# Patient Record
Sex: FEMALE | Race: BLACK OR AFRICAN AMERICAN | NOT HISPANIC OR LATINO | Employment: FULL TIME | URBAN - METROPOLITAN AREA
[De-identification: names, ages, dates, MRNs, and addresses within clinical notes are randomized per-mention and may not be internally consistent; named-entity substitution may affect disease eponyms.]

---

## 2018-04-26 ENCOUNTER — TREATMENT (OUTPATIENT)
Dept: PHYSICAL THERAPY | Facility: CLINIC | Age: 33
End: 2018-04-26

## 2018-04-26 DIAGNOSIS — S39.012D LUMBAR SPINE STRAIN, SUBSEQUENT ENCOUNTER: ICD-10-CM

## 2018-04-26 DIAGNOSIS — M54.50 ACUTE RIGHT-SIDED LOW BACK PAIN WITHOUT SCIATICA: ICD-10-CM

## 2018-04-26 DIAGNOSIS — M25.521 RIGHT ELBOW PAIN: Primary | ICD-10-CM

## 2018-04-26 DIAGNOSIS — S50.01XD CONTUSION OF RIGHT ELBOW, SUBSEQUENT ENCOUNTER: ICD-10-CM

## 2018-04-26 PROCEDURE — 97112 NEUROMUSCULAR REEDUCATION: CPT | Performed by: PHYSICAL THERAPIST

## 2018-04-26 PROCEDURE — 97001 PR PHYS THERAPY EVALUATION: CPT | Performed by: PHYSICAL THERAPIST

## 2018-04-26 PROCEDURE — 97110 THERAPEUTIC EXERCISES: CPT | Performed by: PHYSICAL THERAPIST

## 2018-04-26 PROCEDURE — 97014 ELECTRIC STIMULATION THERAPY: CPT | Performed by: PHYSICAL THERAPIST

## 2018-04-26 NOTE — PROGRESS NOTES
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  The pt was able to rise from the fall, but she had instant burning pain in her R shoulder and back.  This burning pain continued for a few days, but eventually subsided.      The pt has constant pain and is unable to sleep at night.    The pt is currently taking pain meds and performing gentle stretches which are helping.    At work she is required to push/pull a WC with a client in it, transfer pt's in bed, the pt needs to clean clients' rooms: floor to OH with sweeping, mopping, vaccuming, and must be able to perform prolong standing, sitting, walking, walking with packages, and meal preparation for the clients.  Currently these tasks increase her pains.    Additionally, the pt reports she is limited with ADLs at home.              Patient Occupation: Life Skill Trainer   Precautions and Work Restrictions: light dutes: no push/pill, lifting >20#Pain  Current pain ratin  At best pain ratin  At worst pain ratin  Quality: dull ache and burning  Relieving factors: change in position and medications  Aggravating factors: overhead activity, ambulation, squatting, lifting, standing, prolonged positioning, repetitive movement and outstretched reach  Progression: improved    Social Support  Lives in: apartment  Lives with: spouse and young children    Hand dominance: right    Diagnostic Tests  X-ray: normal    Patient Goals  Patient goals for therapy: decreased pain, increased strength, return to work and independence with ADLs/IADLs             Objective       Active Range of Motion     Additional Active Range of Motion Details  Pt amb with a slow and guarded pace and kept her R UE tucked close to her body with minimal arm sway.  The pt  required hand assist to rise and to sit from surfaces.    L UE WFL for all movements  All cervical ROM are WFL and without pain, except cerivcal rot R increase low back pain    R Shoulder flex: 90 deg/ PROM: 180deg  R shoulder abd: 90 deg/ PROM: 160 deg, pain with returning the arm into adduction  IR: S1  ER: Suprapsinatus muscle belly    Elbow ROM:   Flex: full, reports of pain  Ext: full, reports of pain   Sup: full, reports of pain  Pro: full, no reports of pain    + pain with palpation at minimal depth along medial R elbow, across anterior elbow, at lateral elbow and at the distal lateral humerus.      Lumbar flex: 50% pain in shoulder and R mid back  L SB: full no pain  R SB: 50% pain in back  Ext: full, pain in back  Rotation equal 45 deg, pain with R rot    Palpation for tenderness: + with moderate depth of palpation along R iliac crest, at R greater trochanter and R glute.  Negative at deep depth of palpation along T12-S1 spinous processes and lower quadrants.    R shoulder MMT: unable to accurately assess secondary to reports of pain with reaching test positions.  Plan to reassess once pain and guarding are decreased.    Hip flex, abd, and adduction all 4+/5 with reports of pain into the low back.  Knee MMT: 5/5 for all movements without reports of pain into the low back.    Unable to full assess elbow and hip joint mobs secondary to guarding.    The pt was negative for tinel's at the elbow and wrist.    The pt demo no neurological affect with the ability to complete all fine motor commands of the hand, but report occasional burning pains in her arm for movements performed throughout her day, but no movements within the clinic reproduced her burning sensation.      The pt was able to accurately locate palpation of sharp and dull throughout the R UE and bilaterally, but reported hyper-sensation in the R compared to L.           Assessment & Plan     Assessment  Impairments: abnormal gait, abnormal muscle  firing, abnormal muscle tone, abnormal or restricted ROM, activity intolerance, impaired physical strength, lacks appropriate home exercise program, pain with function and safety issue  Assessment details: The 33 y.o female presents after a fall at work resulting in R sided body pains and injuries to her R elbow and low back.  The pt is limited in shoulder ROM, pain with palpation, decreased muscular strength, inabilities to complete work and home duties, and is unable to sleep all secondary to the pains.  Skilled PT can address these limitations through therapeutic exercise, therapeutic activities, neuromuscular re-education, manual therapy, and modalities.        Quick DASH score: 36.36 disabled  Oswestry score: 10% disabled  Prognosis: good  Prognosis details: Short Term Goals (2 weeks)    Patient is independent with HEP  Patient is able to sleep without being disrupted by pain.   Patient has full AROM/PROM of right shoulder abd.  Patient has greater than 50% improvement overall.   Patient is able to reach into overhead cabinets with minimal discomfort    Long Term Goals (4 weeks)    Patient is able to return to work with minimal to no discomfort.  Patient is able to push/pull a client in a WC as far and as long as needed for work.  Patient is able to demo proper transferring techniques of a client in bed for work related tasks.  Patient is able to perform heavy housekeeping duties with minimal to no discomfort for work duties.  Patient is able stand for as long as needed for work related tasks.   Patient is able to amb while carrying packages as needed for daily tasks with minimal to no discomfort.   Pt will improve her Quick DASH by 50% from initial evaluation score.     Functional Limitations: carrying objects, lifting, pulling, pushing, uncomfortable because of pain, reaching overhead and unable to perform repetitive tasks  Plan  Therapy options: will be seen for skilled physical therapy services  Planned  modality interventions: cryotherapy, TENS, thermotherapy (hydrocollator packs) and ultrasound  Planned therapy interventions: abdominal trunk stabilization, ADL retraining, body mechanics training, flexibility, functional ROM exercises, gait training, home exercise program, joint mobilization, manual therapy, motor coordination training, neuromuscular re-education, postural training, soft tissue mobilization, spinal/joint mobilization, strengthening, stretching, therapeutic activities and transfer training  Frequency: 3x week  Duration in weeks: 4  Treatment plan discussed with: patient  Plan details: Continue skilled PT care to address functional limitations to return the pt to her PLOF, as appropriate.          Manual Therapy:    0     mins  47591;  Therapeutic Exercise:    12     mins  30412;     Neuromuscular Antonio:    13    mins  72690;    Therapeutic Activity:     0     mins  86319;     Gait Trainin     mins  34690;     Ultrasound:     0     mins  37784;    Work Hardening           0      mins 23956  Iontophoresis               0   mins 88013    Timed Treatment:   25   mins   Total Treatment:     60   mins    PT SIGNATURE: Emily Howard, PT   DATE TREATMENT INITIATED: 2018    Initial Certification  Certification Period: 2018  I certify that the therapy services are furnished while this patient is under my care.  The services outlined above are required by this patient, and will be reviewed every 90 days.     PHYSICIAN: Diallo Ramirez, DO      DATE:     Please sign and return via fax to [phone].. Thank you, [facility].

## 2019-06-15 ENCOUNTER — HOSPITAL ENCOUNTER (EMERGENCY)
Facility: HOSPITAL | Age: 34
Discharge: HOME | End: 2019-06-15
Attending: EMERGENCY MEDICINE | Admitting: EMERGENCY MEDICINE

## 2019-06-15 VITALS
DIASTOLIC BLOOD PRESSURE: 79 MMHG | HEART RATE: 86 BPM | SYSTOLIC BLOOD PRESSURE: 116 MMHG | BODY MASS INDEX: 38.17 KG/M2 | TEMPERATURE: 98.3 F | RESPIRATION RATE: 16 BRPM | HEIGHT: 56 IN | OXYGEN SATURATION: 100 % | WEIGHT: 169.7 LBS

## 2019-06-15 DIAGNOSIS — R60.9 PERIPHERAL EDEMA: Primary | ICD-10-CM

## 2019-06-15 LAB
ALBUMIN SERPL-MCNC: 3.4 G/DL (ref 3.5–5.2)
ALBUMIN/GLOB SERPL: 1.2 G/DL
ALP SERPL-CCNC: 59 U/L (ref 39–117)
ALT SERPL W P-5'-P-CCNC: 23 U/L (ref 1–33)
ANION GAP SERPL CALCULATED.3IONS-SCNC: 10.5 MMOL/L
AST SERPL-CCNC: 22 U/L (ref 1–32)
BASOPHILS # BLD AUTO: 0.01 10*3/MM3 (ref 0–0.2)
BASOPHILS NFR BLD AUTO: 0.2 % (ref 0–1.5)
BILIRUB SERPL-MCNC: <0.2 MG/DL (ref 0.2–1.2)
BILIRUB UR QL STRIP: NEGATIVE
BUN BLD-MCNC: 11 MG/DL (ref 6–20)
BUN/CREAT SERPL: 16.4 (ref 7–25)
CALCIUM SPEC-SCNC: 8.9 MG/DL (ref 8.6–10.5)
CHLORIDE SERPL-SCNC: 103 MMOL/L (ref 98–107)
CLARITY UR: CLEAR
CO2 SERPL-SCNC: 21.5 MMOL/L (ref 22–29)
COLOR UR: YELLOW
CREAT BLD-MCNC: 0.67 MG/DL (ref 0.57–1)
DEPRECATED RDW RBC AUTO: 47.1 FL (ref 37–54)
EOSINOPHIL # BLD AUTO: 0.04 10*3/MM3 (ref 0–0.4)
EOSINOPHIL NFR BLD AUTO: 0.6 % (ref 0.3–6.2)
ERYTHROCYTE [DISTWIDTH] IN BLOOD BY AUTOMATED COUNT: 13.7 % (ref 12.3–15.4)
GFR SERPL CREATININE-BSD FRML MDRD: 123 ML/MIN/1.73
GLOBULIN UR ELPH-MCNC: 2.9 GM/DL
GLUCOSE BLD-MCNC: 100 MG/DL (ref 65–99)
GLUCOSE UR STRIP-MCNC: NEGATIVE MG/DL
HCT VFR BLD AUTO: 31.2 % (ref 34–46.6)
HGB BLD-MCNC: 10 G/DL (ref 12–15.9)
HGB UR QL STRIP.AUTO: NEGATIVE
IMM GRANULOCYTES # BLD AUTO: 0.05 10*3/MM3 (ref 0–0.05)
IMM GRANULOCYTES NFR BLD AUTO: 0.8 % (ref 0–0.5)
KETONES UR QL STRIP: NEGATIVE
LEUKOCYTE ESTERASE UR QL STRIP.AUTO: NEGATIVE
LYMPHOCYTES # BLD AUTO: 1.77 10*3/MM3 (ref 0.7–3.1)
LYMPHOCYTES NFR BLD AUTO: 27.7 % (ref 19.6–45.3)
MCH RBC QN AUTO: 30.3 PG (ref 26.6–33)
MCHC RBC AUTO-ENTMCNC: 32.1 G/DL (ref 31.5–35.7)
MCV RBC AUTO: 94.5 FL (ref 79–97)
MONOCYTES # BLD AUTO: 0.54 10*3/MM3 (ref 0.1–0.9)
MONOCYTES NFR BLD AUTO: 8.5 % (ref 5–12)
NEUTROPHILS # BLD AUTO: 3.98 10*3/MM3 (ref 1.7–7)
NEUTROPHILS NFR BLD AUTO: 62.2 % (ref 42.7–76)
NITRITE UR QL STRIP: NEGATIVE
NRBC BLD AUTO-RTO: 0 /100 WBC (ref 0–0.2)
PH UR STRIP.AUTO: 6 [PH] (ref 5–8)
PLATELET # BLD AUTO: 220 10*3/MM3 (ref 140–450)
PMV BLD AUTO: 10.4 FL (ref 6–12)
POTASSIUM BLD-SCNC: 3.6 MMOL/L (ref 3.5–5.2)
PROT SERPL-MCNC: 6.3 G/DL (ref 6–8.5)
PROT UR QL STRIP: NEGATIVE
RBC # BLD AUTO: 3.3 10*6/MM3 (ref 3.77–5.28)
SODIUM BLD-SCNC: 135 MMOL/L (ref 136–145)
SP GR UR STRIP: >=1.03 (ref 1–1.03)
UROBILINOGEN UR QL STRIP: NORMAL
WBC NRBC COR # BLD: 6.39 10*3/MM3 (ref 3.4–10.8)

## 2019-06-15 PROCEDURE — 81003 URINALYSIS AUTO W/O SCOPE: CPT | Performed by: PHYSICIAN ASSISTANT

## 2019-06-15 PROCEDURE — 36415 COLL VENOUS BLD VENIPUNCTURE: CPT

## 2019-06-15 PROCEDURE — 80053 COMPREHEN METABOLIC PANEL: CPT | Performed by: PHYSICIAN ASSISTANT

## 2019-06-15 PROCEDURE — 99284 EMERGENCY DEPT VISIT MOD MDM: CPT

## 2019-06-15 PROCEDURE — 85025 COMPLETE CBC W/AUTO DIFF WBC: CPT | Performed by: PHYSICIAN ASSISTANT

## 2019-06-15 RX ORDER — ACETAMINOPHEN 500 MG
1000 TABLET ORAL ONCE
Status: COMPLETED | OUTPATIENT
Start: 2019-06-15 | End: 2019-06-15

## 2019-06-15 RX ADMIN — ACETAMINOPHEN 1000 MG: 500 TABLET, FILM COATED ORAL at 20:38

## 2019-06-17 ENCOUNTER — HOSPITAL ENCOUNTER (EMERGENCY)
Facility: HOSPITAL | Age: 34
Discharge: HOME | End: 2019-06-17
Attending: EMERGENCY MEDICINE | Admitting: EMERGENCY MEDICINE

## 2019-06-17 VITALS
DIASTOLIC BLOOD PRESSURE: 70 MMHG | WEIGHT: 168 LBS | BODY MASS INDEX: 37.79 KG/M2 | OXYGEN SATURATION: 99 % | SYSTOLIC BLOOD PRESSURE: 118 MMHG | RESPIRATION RATE: 17 BRPM | HEIGHT: 56 IN | TEMPERATURE: 98.2 F | HEART RATE: 93 BPM

## 2019-06-17 DIAGNOSIS — Z3A.18 18 WEEKS GESTATION OF PREGNANCY: ICD-10-CM

## 2019-06-17 DIAGNOSIS — R60.9 PERIPHERAL EDEMA: Primary | ICD-10-CM

## 2019-06-17 DIAGNOSIS — G56.03 BILATERAL CARPAL TUNNEL SYNDROME: ICD-10-CM

## 2019-06-17 DIAGNOSIS — R45.89 DEPRESSED MOOD: ICD-10-CM

## 2019-06-17 LAB
ALBUMIN SERPL-MCNC: 3.4 G/DL (ref 3.5–5.2)
ALBUMIN/GLOB SERPL: 1 G/DL
ALP SERPL-CCNC: 59 U/L (ref 39–117)
ALT SERPL W P-5'-P-CCNC: 23 U/L (ref 1–33)
AMPHET+METHAMPHET UR QL: NEGATIVE
ANION GAP SERPL CALCULATED.3IONS-SCNC: 10.8 MMOL/L
AST SERPL-CCNC: 25 U/L (ref 1–32)
BARBITURATES UR QL SCN: NEGATIVE
BASOPHILS # BLD AUTO: 0.01 10*3/MM3 (ref 0–0.2)
BASOPHILS NFR BLD AUTO: 0.1 % (ref 0–1.5)
BENZODIAZ UR QL SCN: NEGATIVE
BILIRUB SERPL-MCNC: <0.2 MG/DL (ref 0.2–1.2)
BILIRUB UR QL STRIP: NEGATIVE
BUN BLD-MCNC: 6 MG/DL (ref 6–20)
BUN/CREAT SERPL: 12.5 (ref 7–25)
CALCIUM SPEC-SCNC: 9.2 MG/DL (ref 8.6–10.5)
CANNABINOIDS SERPL QL: NEGATIVE
CHLORIDE SERPL-SCNC: 105 MMOL/L (ref 98–107)
CLARITY UR: ABNORMAL
CO2 SERPL-SCNC: 23.2 MMOL/L (ref 22–29)
COCAINE UR QL: NEGATIVE
COLOR UR: YELLOW
CREAT BLD-MCNC: 0.48 MG/DL (ref 0.57–1)
DEPRECATED RDW RBC AUTO: 47.5 FL (ref 37–54)
EOSINOPHIL # BLD AUTO: 0.03 10*3/MM3 (ref 0–0.4)
EOSINOPHIL NFR BLD AUTO: 0.4 % (ref 0.3–6.2)
ERYTHROCYTE [DISTWIDTH] IN BLOOD BY AUTOMATED COUNT: 14 % (ref 12.3–15.4)
ETHANOL BLD-MCNC: <10 MG/DL (ref 0–10)
ETHANOL UR QL: <0.01 %
GFR SERPL CREATININE-BSD FRML MDRD: >150 ML/MIN/1.73
GLOBULIN UR ELPH-MCNC: 3.3 GM/DL
GLUCOSE BLD-MCNC: 90 MG/DL (ref 65–99)
GLUCOSE UR STRIP-MCNC: NEGATIVE MG/DL
HCT VFR BLD AUTO: 35.4 % (ref 34–46.6)
HGB BLD-MCNC: 11.4 G/DL (ref 12–15.9)
HGB UR QL STRIP.AUTO: NEGATIVE
IMM GRANULOCYTES # BLD AUTO: 0.04 10*3/MM3 (ref 0–0.05)
IMM GRANULOCYTES NFR BLD AUTO: 0.5 % (ref 0–0.5)
KETONES UR QL STRIP: NEGATIVE
LEUKOCYTE ESTERASE UR QL STRIP.AUTO: NEGATIVE
LYMPHOCYTES # BLD AUTO: 1.9 10*3/MM3 (ref 0.7–3.1)
LYMPHOCYTES NFR BLD AUTO: 25.9 % (ref 19.6–45.3)
MCH RBC QN AUTO: 30 PG (ref 26.6–33)
MCHC RBC AUTO-ENTMCNC: 32.2 G/DL (ref 31.5–35.7)
MCV RBC AUTO: 93.2 FL (ref 79–97)
METHADONE UR QL SCN: NEGATIVE
MONOCYTES # BLD AUTO: 0.63 10*3/MM3 (ref 0.1–0.9)
MONOCYTES NFR BLD AUTO: 8.6 % (ref 5–12)
NEUTROPHILS # BLD AUTO: 4.72 10*3/MM3 (ref 1.7–7)
NEUTROPHILS NFR BLD AUTO: 64.5 % (ref 42.7–76)
NITRITE UR QL STRIP: NEGATIVE
NRBC BLD AUTO-RTO: 0 /100 WBC (ref 0–0.2)
OPIATES UR QL: NEGATIVE
OXYCODONE UR QL SCN: NEGATIVE
PH UR STRIP.AUTO: 7 [PH] (ref 5–8)
PLATELET # BLD AUTO: 235 10*3/MM3 (ref 140–450)
PMV BLD AUTO: 10.9 FL (ref 6–12)
POTASSIUM BLD-SCNC: 3.6 MMOL/L (ref 3.5–5.2)
PROT SERPL-MCNC: 6.7 G/DL (ref 6–8.5)
PROT UR QL STRIP: NEGATIVE
RBC # BLD AUTO: 3.8 10*6/MM3 (ref 3.77–5.28)
SODIUM BLD-SCNC: 139 MMOL/L (ref 136–145)
SP GR UR STRIP: 1.02 (ref 1–1.03)
UROBILINOGEN UR QL STRIP: ABNORMAL
WBC NRBC COR # BLD: 7.33 10*3/MM3 (ref 3.4–10.8)

## 2019-06-17 PROCEDURE — 80307 DRUG TEST PRSMV CHEM ANLYZR: CPT | Performed by: EMERGENCY MEDICINE

## 2019-06-17 PROCEDURE — 90791 PSYCH DIAGNOSTIC EVALUATION: CPT | Performed by: SOCIAL WORKER

## 2019-06-17 PROCEDURE — 85025 COMPLETE CBC W/AUTO DIFF WBC: CPT | Performed by: EMERGENCY MEDICINE

## 2019-06-17 PROCEDURE — 81003 URINALYSIS AUTO W/O SCOPE: CPT | Performed by: EMERGENCY MEDICINE

## 2019-06-17 PROCEDURE — 80053 COMPREHEN METABOLIC PANEL: CPT | Performed by: EMERGENCY MEDICINE

## 2019-06-17 PROCEDURE — 99284 EMERGENCY DEPT VISIT MOD MDM: CPT

## 2019-06-17 RX ORDER — SODIUM CHLORIDE 0.9 % (FLUSH) 0.9 %
10 SYRINGE (ML) INJECTION AS NEEDED
Status: DISCONTINUED | OUTPATIENT
Start: 2019-06-17 | End: 2019-06-17 | Stop reason: HOSPADM

## 2019-06-17 RX ORDER — ACETAMINOPHEN 500 MG
1000 TABLET ORAL EVERY 6 HOURS PRN
COMMUNITY

## 2019-06-17 RX ORDER — PRENATAL VIT/IRON FUM/FOLIC AC 27MG-0.8MG
TABLET ORAL DAILY
COMMUNITY

## 2020-06-24 ENCOUNTER — LAB REQUISITION (OUTPATIENT)
Dept: LAB | Facility: OTHER | Age: 35
End: 2020-06-24

## 2020-06-24 DIAGNOSIS — Z11.1 SCREENING-PULMONARY TB: ICD-10-CM

## 2020-06-24 PROCEDURE — 86481 TB AG RESPONSE T-CELL SUSP: CPT | Performed by: PHYSICAL MEDICINE & REHABILITATION

## 2020-06-28 LAB
TSPOT INTERPRETATION: POSITIVE
TSPOT NIL CONTROL INTERPRETATION: ABNORMAL
TSPOT PANEL A: 8
TSPOT PANEL B: 21
TSPOT POS CONTROL INTERPRETATION: ABNORMAL

## 2020-12-26 ENCOUNTER — HOSPITAL ENCOUNTER (EMERGENCY)
Facility: HOSPITAL | Age: 35
Discharge: HOME | End: 2020-12-26
Attending: EMERGENCY MEDICINE | Admitting: EMERGENCY MEDICINE

## 2020-12-26 VITALS
SYSTOLIC BLOOD PRESSURE: 113 MMHG | HEIGHT: 63 IN | OXYGEN SATURATION: 99 % | WEIGHT: 152 LBS | BODY MASS INDEX: 26.93 KG/M2 | DIASTOLIC BLOOD PRESSURE: 69 MMHG | HEART RATE: 99 BPM | TEMPERATURE: 96.8 F | RESPIRATION RATE: 16 BRPM

## 2020-12-26 DIAGNOSIS — M54.50 ACUTE MIDLINE LOW BACK PAIN WITHOUT SCIATICA: Primary | ICD-10-CM

## 2020-12-26 PROCEDURE — 99282 EMERGENCY DEPT VISIT SF MDM: CPT

## 2020-12-26 RX ORDER — NAPROXEN 500 MG/1
500 TABLET ORAL 2 TIMES DAILY WITH MEALS
Qty: 30 TABLET | Refills: 0 | Status: SHIPPED | OUTPATIENT
Start: 2020-12-26

## 2020-12-26 NOTE — ED PROVIDER NOTES
EMERGENCY DEPARTMENT ENCOUNTER    Room Number:  13/13  Date of encounter:  12/26/2020  PCP: Provider, No Known  Historian: Patient     I used full protective equipment while examining this patient.  This includes face mask, gloves and protective eyewear.  I washed my hands before entering the room and immediately upon leaving the room      HPI:  Chief Complaint: Back pain  A complete HPI/ROS/PMH/PSH/SH/FH are unobtainable due to: None    Context: Yuliya Gayle is a 35 y.o. female who presents to the ED c/o back pain.  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  Last night while lifting a patient she developed acute pain in the lower back.  Pain is moderate to severe and worsened with movement and lifting.  Pain does not radiate down the legs.  She has had no weakness numbness or incontinence.  Patient denies a history of low back pain.  She states she took 2 ibuprofen last night but has not taken any medication for pain since that time.  Patient is on the schedule to work for tonight.      PAST MEDICAL HISTORY  Active Ambulatory Problems     Diagnosis Date Noted   • No Active Ambulatory Problems     Resolved Ambulatory Problems     Diagnosis Date Noted   • No Resolved Ambulatory Problems     No Additional Past Medical History         PAST SURGICAL HISTORY  History reviewed. No pertinent surgical history.      FAMILY HISTORY  No family history on file.      SOCIAL HISTORY  Social History     Socioeconomic History   • Marital status:      Spouse name: Not on file   • Number of children: Not on file   • Years of education: Not on file   • Highest education level: Not on file         ALLERGIES  Patient has no known allergies.       REVIEW OF SYSTEMS  Review of Systems   Constitutional: Negative for fever.   Respiratory: Negative for shortness of breath.    Cardiovascular: Negative for chest pain.   Genitourinary:        LMP now   Musculoskeletal: Positive for back pain.           PHYSICAL EXAM    I have  "reviewed the triage vital signs and nursing notes.    ED Triage Vitals [12/26/20 0903]   Temp Heart Rate Resp BP SpO2   96.8 °F (36 °C) 99 16 -- 99 %      Temp src Heart Rate Source Patient Position BP Location FiO2 (%)   Tympanic Monitor -- -- --       Physical Exam  GENERAL: Alert female in no obvious distress  HENT: nares patent  EYES: no scleral icterus  CV: regular rhythm, regular rate  RESPIRATORY: normal effort  ABDOMEN: soft  MUSCULOSKELETAL: Mild diffuse lumbar tenderness to palpation.  Negative straight leg raise, negative cross straight leg raise  NEURO: Strength, sensation, and coordination are grossly intact.  Speech and mentation are unremarkable  SKIN: warm, dry      PROCEDURES  Procedures      MEDICATIONS GIVEN IN ER    Medications - No data to display      PROGRESS, DATA ANALYSIS, CONSULTS, AND MEDICAL DECISION MAKING    All labs have been independently reviewed by me.  All radiology studies have been reviewed by me and discussed with radiologist dictating the report.   EKG's independently viewed and interpreted by me.  Discussion below represents my analysis of pertinent findings related to patient's condition, differential diagnosis, treatment plan and final disposition.      ED Course as of Dec 26 0940   Sat Dec 26, 2020   0912 [id number] with acute low back pain after lifting patient last night.  I see no \"red flags\" that would suggest worrisome pathology.  Will treat with anti-inflammatories and follow-up with Worker's Comp.  Will give note for work.    [DB]      ED Course User Index  [DB] Diallo Perez MD       AS OF 09:40 EST VITALS:    BP -    HR - 99  TEMP - 96.8 °F (36 °C) (Tympanic)  O2 SATS - 99%      DIAGNOSIS  Final diagnoses:   Acute midline low back pain without sciatica         DISPOSITION  DISCHARGE    Patient discharged in stable condition.    Reviewed implications of results, diagnosis, meds, responsibility to follow up, warning signs and symptoms of possible worsening, " potential complications and reasons to return to ER, including increased pain, fever or as needed.    Patient/Family voiced understanding of above instructions.    Discussed plan for discharge, as there is no emergent indication for admission. Patient referred to primary care provider for BP management due to today's BP. Pt/family is agreeable and understands need for follow up and repeat testing.  Pt is aware that discharge does not mean that nothing is wrong but it indicates no emergency is present that requires admission and they must continue care with follow-up as given below or physician of their choice.     FOLLOW-UP  [facility] OCCUPATIONAL [facility]ICINE [address] [address] [phone]  In 1 day  Call for Appointment         Medication List      New Prescriptions    naproxen 500 MG tablet  Commonly known as: Naprosyn  Take 1 tablet by mouth 2 (Two) Times a Day With Meals.           Where to Get Your Medications      You can get these medications from any pharmacy    Bring a paper prescription for each of these medications  · naproxen 500 MG tablet                Diallo Perez MD  12/26/20 4588

## 2020-12-26 NOTE — ED TRIAGE NOTES
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     Pt was wearing a mask during assessment.  This RN wore appropriate PPE

## 2021-01-07 ENCOUNTER — TRANSCRIBE ORDERS (OUTPATIENT)
Dept: PHYSICAL THERAPY | Facility: CLINIC | Age: 36
End: 2021-01-07

## 2021-01-07 DIAGNOSIS — S39.012A STRAIN OF LUMBAR REGION, INITIAL ENCOUNTER: Primary | ICD-10-CM

## 2021-01-12 ENCOUNTER — DOCUMENTATION (OUTPATIENT)
Dept: PHYSICAL THERAPY | Facility: CLINIC | Age: 36
End: 2021-01-12

## 2021-01-12 ENCOUNTER — TREATMENT (OUTPATIENT)
Dept: PHYSICAL THERAPY | Facility: CLINIC | Age: 36
End: 2021-01-12

## 2021-01-12 DIAGNOSIS — S39.012D STRAIN OF LUMBAR REGION, SUBSEQUENT ENCOUNTER: Primary | ICD-10-CM

## 2021-01-12 PROCEDURE — 97014 ELECTRIC STIMULATION THERAPY: CPT | Performed by: PHYSICAL THERAPIST

## 2021-01-12 PROCEDURE — 97161 PT EVAL LOW COMPLEX 20 MIN: CPT | Performed by: PHYSICAL THERAPIST

## 2021-01-12 PROCEDURE — 97110 THERAPEUTIC EXERCISES: CPT | Performed by: PHYSICAL THERAPIST

## 2021-01-12 NOTE — PROGRESS NOTES
Discharge Summary  Discharge Summary from Physical Therapy Report    Patient Information  Yuliya Gayle  1985    Dates  PT visit: 4/26/18  Number of Visits: 1     Discharge Status of Patient: See eval.    Goals: Not Met    Visit Diagnoses:  No diagnosis found.    Discharge Plan: Continue with current home exercise program as instructed    Comments patient did not return after eval.    Date of Discharge 1/12/21        Carmelo Christianson, PT  Physical Therapist

## 2021-01-13 ENCOUNTER — TELEPHONE (OUTPATIENT)
Dept: ORTHOPEDICS | Facility: OTHER | Age: 36
End: 2021-01-13

## 2021-01-14 ENCOUNTER — TREATMENT (OUTPATIENT)
Dept: PHYSICAL THERAPY | Facility: CLINIC | Age: 36
End: 2021-01-14

## 2021-01-14 DIAGNOSIS — M54.50 ACUTE RIGHT-SIDED LOW BACK PAIN WITHOUT SCIATICA: ICD-10-CM

## 2021-01-14 DIAGNOSIS — S39.012D STRAIN OF LUMBAR REGION, SUBSEQUENT ENCOUNTER: Primary | ICD-10-CM

## 2021-01-14 PROCEDURE — 97110 THERAPEUTIC EXERCISES: CPT | Performed by: PHYSICAL THERAPIST

## 2021-01-14 NOTE — PROGRESS NOTES
Physical Therapy Daily Progress Note      Visit # 2      Subjective Evaluation    History of Present Illness    Subjective comment: Pt reports that her back feels better.  Still very sore on (R) side.  Current pain rating is 6/10.       Objective   See Exercise, Manual, and Modality Logs for complete treatment.       Assessment & Plan     Assessment  Assessment details: Pt has been seen twice for physical therapy.    Pt tolerated treatment with no c/o increased pain.  She had no issues with progression of reps on strengthening exercise.  She benefits from vc and tc for exercise performance.                     Manual Therapy:    0     mins  61507;  Therapeutic Exercise:    32     mins  77353;     Neuromuscular Antonio:    0    mins  00689;    Therapeutic Activity:     0     mins  51895;     Gait Trainin     mins  80290;     Ultrasound:     0     mins  64745;    Work Hardening           0      mins 79808  Iontophoresis               0   mins 70659  E-Stim                          _0_ mins 16322 ( )    Timed Treatment:   32   mins   Total Treatment:     32   mins    Sherif Lewis PTA  Physical Therapist Assistant

## 2021-01-18 ENCOUNTER — TREATMENT (OUTPATIENT)
Dept: PHYSICAL THERAPY | Facility: CLINIC | Age: 36
End: 2021-01-18

## 2021-01-18 DIAGNOSIS — S39.012D STRAIN OF LUMBAR REGION, SUBSEQUENT ENCOUNTER: Primary | ICD-10-CM

## 2021-01-18 PROCEDURE — 97110 THERAPEUTIC EXERCISES: CPT | Performed by: PHYSICAL THERAPIST

## 2021-01-18 NOTE — PROGRESS NOTES
Physical Therapy Daily Progress Note           Subjective  Patient reports that the back is better, rates the pain at 4/10.    Objective   See Exercise, Manual, and Modality Logs for complete treatment.       Assessment/Plan  Subjective reports are improved from the eval on  (pain 7/10).  Minimal VC's required for the exercise routine.  Patient tolerated the progression of ROM and strengthening exercises without visual or verbal c/o pain.                      Manual Therapy:    0     mins  11540;  Therapeutic Exercise:    24     mins  61441;     Neuromuscular Antonio:    0    mins  12873;    Therapeutic Activity:     0     mins  51882;     Gait Trainin     mins  99956;     Ultrasound:     0     mins  14143;    Work Hardening           0      mins 96075  Iontophoresis               0   mins 30623    Timed Treatment:   24   mins   Total Treatment:     24   mins    Carmelo Christianson, PT  Physical Therapist

## 2021-02-02 ENCOUNTER — DOCUMENTATION (OUTPATIENT)
Dept: PHYSICAL THERAPY | Facility: CLINIC | Age: 36
End: 2021-02-02

## 2021-03-30 ENCOUNTER — HOSPITAL ENCOUNTER (OUTPATIENT)
Dept: GENERAL RADIOLOGY | Facility: HOSPITAL | Age: 36
Discharge: HOME | End: 2021-03-30
Admitting: NURSE PRACTITIONER

## 2021-03-30 ENCOUNTER — HOSPITAL ENCOUNTER (EMERGENCY)
Facility: HOSPITAL | Age: 36
Discharge: HOME | End: 2021-03-30
Attending: EMERGENCY MEDICINE | Admitting: EMERGENCY MEDICINE

## 2021-03-30 ENCOUNTER — APPOINTMENT (OUTPATIENT)
Dept: GENERAL RADIOLOGY | Facility: HOSPITAL | Age: 36
End: 2021-03-30

## 2021-03-30 VITALS
HEIGHT: 60 IN | TEMPERATURE: 97.7 F | SYSTOLIC BLOOD PRESSURE: 108 MMHG | DIASTOLIC BLOOD PRESSURE: 69 MMHG | BODY MASS INDEX: 30.23 KG/M2 | HEART RATE: 94 BPM | OXYGEN SATURATION: 100 % | WEIGHT: 154 LBS | RESPIRATION RATE: 16 BRPM

## 2021-03-30 DIAGNOSIS — R11.2 NON-INTRACTABLE VOMITING WITH NAUSEA, UNSPECIFIED VOMITING TYPE: ICD-10-CM

## 2021-03-30 DIAGNOSIS — N39.0 URINARY TRACT INFECTION WITHOUT HEMATURIA, SITE UNSPECIFIED: Primary | ICD-10-CM

## 2021-03-30 DIAGNOSIS — M54.6 PAIN IN THORACIC SPINE: ICD-10-CM

## 2021-03-30 LAB
ALBUMIN SERPL-MCNC: 4.5 G/DL (ref 3.5–5.2)
ALBUMIN/GLOB SERPL: 1.5 G/DL
ALP SERPL-CCNC: 65 U/L (ref 39–117)
ALT SERPL W P-5'-P-CCNC: 15 U/L (ref 1–33)
ANION GAP SERPL CALCULATED.3IONS-SCNC: 9.5 MMOL/L (ref 5–15)
AST SERPL-CCNC: 16 U/L (ref 1–32)
BACTERIA UR QL AUTO: ABNORMAL /HPF
BASOPHILS # BLD AUTO: 0.01 10*3/MM3 (ref 0–0.2)
BASOPHILS NFR BLD AUTO: 0.2 % (ref 0–1.5)
BILIRUB SERPL-MCNC: 0.4 MG/DL (ref 0–1.2)
BILIRUB UR QL STRIP: NEGATIVE
BUN SERPL-MCNC: 11 MG/DL (ref 6–20)
BUN/CREAT SERPL: 13.1 (ref 7–25)
CALCIUM SPEC-SCNC: 9.5 MG/DL (ref 8.6–10.5)
CHLORIDE SERPL-SCNC: 101 MMOL/L (ref 98–107)
CLARITY UR: CLEAR
CO2 SERPL-SCNC: 28.5 MMOL/L (ref 22–29)
COLOR UR: YELLOW
CREAT SERPL-MCNC: 0.84 MG/DL (ref 0.57–1)
DEPRECATED RDW RBC AUTO: 39 FL (ref 37–54)
EOSINOPHIL # BLD AUTO: 0.01 10*3/MM3 (ref 0–0.4)
EOSINOPHIL NFR BLD AUTO: 0.2 % (ref 0.3–6.2)
ERYTHROCYTE [DISTWIDTH] IN BLOOD BY AUTOMATED COUNT: 12.4 % (ref 12.3–15.4)
GFR SERPL CREATININE-BSD FRML MDRD: 94 ML/MIN/1.73
GLOBULIN UR ELPH-MCNC: 3.1 GM/DL
GLUCOSE SERPL-MCNC: 92 MG/DL (ref 65–99)
GLUCOSE UR STRIP-MCNC: NEGATIVE MG/DL
HCG SERPL QL: NEGATIVE
HCT VFR BLD AUTO: 41.1 % (ref 34–46.6)
HGB BLD-MCNC: 14 G/DL (ref 12–15.9)
HGB UR QL STRIP.AUTO: ABNORMAL
HYALINE CASTS UR QL AUTO: ABNORMAL /LPF
IMM GRANULOCYTES # BLD AUTO: 0.01 10*3/MM3 (ref 0–0.05)
IMM GRANULOCYTES NFR BLD AUTO: 0.2 % (ref 0–0.5)
KETONES UR QL STRIP: ABNORMAL
LEUKOCYTE ESTERASE UR QL STRIP.AUTO: ABNORMAL
LIPASE SERPL-CCNC: 13 U/L (ref 13–60)
LYMPHOCYTES # BLD AUTO: 1.88 10*3/MM3 (ref 0.7–3.1)
LYMPHOCYTES NFR BLD AUTO: 31 % (ref 19.6–45.3)
MAGNESIUM SERPL-MCNC: 2.2 MG/DL (ref 1.6–2.6)
MCH RBC QN AUTO: 29.9 PG (ref 26.6–33)
MCHC RBC AUTO-ENTMCNC: 34.1 G/DL (ref 31.5–35.7)
MCV RBC AUTO: 87.8 FL (ref 79–97)
MONOCYTES # BLD AUTO: 0.54 10*3/MM3 (ref 0.1–0.9)
MONOCYTES NFR BLD AUTO: 8.9 % (ref 5–12)
MUCOUS THREADS URNS QL MICRO: ABNORMAL /HPF
NEUTROPHILS NFR BLD AUTO: 3.61 10*3/MM3 (ref 1.7–7)
NEUTROPHILS NFR BLD AUTO: 59.5 % (ref 42.7–76)
NITRITE UR QL STRIP: NEGATIVE
NRBC BLD AUTO-RTO: 0 /100 WBC (ref 0–0.2)
PH UR STRIP.AUTO: 8 [PH] (ref 5–8)
PLATELET # BLD AUTO: 270 10*3/MM3 (ref 140–450)
PMV BLD AUTO: 10 FL (ref 6–12)
POTASSIUM SERPL-SCNC: 3.9 MMOL/L (ref 3.5–5.2)
PROT SERPL-MCNC: 7.6 G/DL (ref 6–8.5)
PROT UR QL STRIP: ABNORMAL
RBC # BLD AUTO: 4.68 10*6/MM3 (ref 3.77–5.28)
RBC # UR: ABNORMAL /HPF
REF LAB TEST METHOD: ABNORMAL
SODIUM SERPL-SCNC: 139 MMOL/L (ref 136–145)
SP GR UR STRIP: >=1.03 (ref 1–1.03)
SQUAMOUS #/AREA URNS HPF: ABNORMAL /HPF
UROBILINOGEN UR QL STRIP: ABNORMAL
WBC # BLD AUTO: 6.06 10*3/MM3 (ref 3.4–10.8)
WBC UR QL AUTO: ABNORMAL /HPF

## 2021-03-30 PROCEDURE — 93010 ELECTROCARDIOGRAM REPORT: CPT | Performed by: INTERNAL MEDICINE

## 2021-03-30 PROCEDURE — 81001 URINALYSIS AUTO W/SCOPE: CPT | Performed by: PHYSICIAN ASSISTANT

## 2021-03-30 PROCEDURE — 72070 X-RAY EXAM THORAC SPINE 2VWS: CPT

## 2021-03-30 PROCEDURE — 25010000002 ONDANSETRON PER 1 MG: Performed by: PHYSICIAN ASSISTANT

## 2021-03-30 PROCEDURE — 85025 COMPLETE CBC W/AUTO DIFF WBC: CPT | Performed by: PHYSICIAN ASSISTANT

## 2021-03-30 PROCEDURE — 93005 ELECTROCARDIOGRAM TRACING: CPT | Performed by: PHYSICIAN ASSISTANT

## 2021-03-30 PROCEDURE — 80053 COMPREHEN METABOLIC PANEL: CPT | Performed by: PHYSICIAN ASSISTANT

## 2021-03-30 PROCEDURE — 84703 CHORIONIC GONADOTROPIN ASSAY: CPT | Performed by: PHYSICIAN ASSISTANT

## 2021-03-30 PROCEDURE — 83690 ASSAY OF LIPASE: CPT | Performed by: PHYSICIAN ASSISTANT

## 2021-03-30 PROCEDURE — 71045 X-RAY EXAM CHEST 1 VIEW: CPT

## 2021-03-30 PROCEDURE — 83735 ASSAY OF MAGNESIUM: CPT | Performed by: PHYSICIAN ASSISTANT

## 2021-03-30 PROCEDURE — 96374 THER/PROPH/DIAG INJ IV PUSH: CPT

## 2021-03-30 PROCEDURE — 99283 EMERGENCY DEPT VISIT LOW MDM: CPT

## 2021-03-30 RX ORDER — ONDANSETRON 4 MG/1
4 TABLET, ORALLY DISINTEGRATING ORAL 4 TIMES DAILY PRN
Qty: 20 TABLET | Refills: 0 | Status: SHIPPED | OUTPATIENT
Start: 2021-03-30 | End: 2021-04-04

## 2021-03-30 RX ORDER — NITROFURANTOIN 25; 75 MG/1; MG/1
100 CAPSULE ORAL 2 TIMES DAILY
Qty: 10 CAPSULE | Refills: 0 | Status: SHIPPED | OUTPATIENT
Start: 2021-03-30 | End: 2021-04-04

## 2021-03-30 RX ORDER — ONDANSETRON 2 MG/ML
4 INJECTION INTRAMUSCULAR; INTRAVENOUS ONCE
Status: COMPLETED | OUTPATIENT
Start: 2021-03-30 | End: 2021-03-30

## 2021-03-30 RX ADMIN — SODIUM CHLORIDE 1000 ML: 9 INJECTION, SOLUTION INTRAVENOUS at 17:13

## 2021-03-30 RX ADMIN — ONDANSETRON 4 MG: 2 INJECTION INTRAMUSCULAR; INTRAVENOUS at 17:30

## 2021-03-31 LAB — QT INTERVAL: 408 MS

## 2023-12-30 NOTE — PROGRESS NOTES
GYN ANNUAL EXAM     Chief Complaint   Patient presents with    [facility]    Gynecologic Exam     Recurrent yeast infections, mainly after intercourse. Cycles has changed from once a month to twice a month x 3-4 months. Had a miscarriage last year and has not been able to get pregnant since then. Reports multiple fibroids       HPI    Yuliya Gayle is a 38 y.o. female who presents for annual well woman exam.  She is sexually active with men. Periods are regular every 28-30 days, lasting 4 days. Dysmenorrhea:none.Performing SBE: performs. She frequently gets yeast infections but always has a lot of discharge. She has noticed it is worse after intercourse. She experienced a miscarriage in 2023 where she says that the baby stopped growing. She has been trying to get pregnant since then but has not used any OPK or similar. She does mention a history of fibroids and occasional ovarian cysts. During intercourse, she does experience issues with penetration, including muscular tightness.     OB History          1    Para        Term                AB        Living             SAB        IAB        Ectopic        Molar        Multiple        Live Births                    LMP:  23  Current contraception: none  Last Pap: unknown last date  History of abnormal Pap smear: no  History of STD: No  Family history of cancer: denies       Family history of breast cancer: no    SUBJECTIVE    History reviewed. No pertinent past medical history.    History reviewed. No pertinent surgical history.      Current Outpatient Medications:     acetaminophen (TYLENOL) 500 MG tablet, Take 2 tablets by mouth Every 6 (Six) Hours As Needed for Mild Pain., Disp: , Rfl:     fluconazole (DIFLUCAN) 150 MG tablet, Take 1 tablet by mouth Every 7 (Seven) Days for 2 doses. (Patient not taking: Reported on 2024), Disp: 2 tablet, Rfl: 0    naproxen (Naprosyn) 500 MG tablet, Take 1 tablet by mouth 2 (Two) Times a Day With  "Meals., Disp: 30 tablet, Rfl: 0    Prenatal Vit-Fe Fumarate-FA (PRENATAL VITAMIN 27-0.8) 27-0.8 MG tablet tablet, Take  by mouth Daily., Disp: , Rfl:     Prenatal Vit-Fe Fumarate-FA (Prenatal/Folic Acid) tablet, Take 1 tablet by mouth Daily., Disp: 30 each, Rfl: 10    No Known Allergies    Social History     Tobacco Use    Smoking status: Never    Smokeless tobacco: Never   Vaping Use    Vaping Use: Never used   Substance Use Topics    Alcohol use: Never    Drug use: Never       History reviewed. No pertinent family history.    Review of Systems   All other systems reviewed and are negative.      OBJECTIVE    /74   Ht 152.4 cm (60\")   Wt 73.8 kg (162 lb 12.8 oz)   LMP 12/28/2023   Breastfeeding No   BMI 31.79 kg/m²     Physical Exam  Constitutional:       General: She is awake. She is not in acute distress.     Appearance: Normal appearance. She is well-developed, well-groomed and normal weight. She is not ill-appearing.   Genitourinary:      Vulva, bladder and urethral meatus normal.      Right Labia: No rash, tenderness, lesions or skin changes.     Left Labia: No tenderness, lesions, skin changes or rash.     No labial fusion noted.      No inguinal adenopathy present in the right or left side.     No vaginal discharge, erythema, tenderness, bleeding, ulceration or granulation tissue.      No vaginal prolapse present.     No vaginal atrophy present.     No cervical discharge, friability, lesion, polyp, eversion or elongation.      Uterus is not enlarged, tender or prolapsed.      Pelvic exam was performed with patient in the lithotomy position.   Breasts:     Breasts are symmetrical.      Breasts are soft.     Right: Normal.      Left: Normal.   HENT:      Head: Normocephalic and atraumatic.   Eyes:      General: No scleral icterus.     Conjunctiva/sclera: Conjunctivae normal.   Cardiovascular:      Rate and Rhythm: Normal rate and regular rhythm.      Heart sounds: Normal heart sounds.   Pulmonary:    "   Effort: Pulmonary effort is normal.      Breath sounds: Normal breath sounds.   Abdominal:      Palpations: Abdomen is soft.      Hernia: There is no hernia in the left inguinal area or right inguinal area.   Musculoskeletal:         General: Normal range of motion.      Cervical back: Normal range of motion.   Lymphadenopathy:      Lower Body: No right inguinal adenopathy. No left inguinal adenopathy.   Neurological:      General: No focal deficit present.      Mental Status: She is alert and oriented to person, place, and time.   Skin:     General: Skin is warm and dry.      Coloration: Skin is not jaundiced or pale.   Psychiatric:         Mood and Affect: Mood normal.         Behavior: Behavior normal. Behavior is cooperative.   Vitals reviewed.         ASSESSMENT   Diagnoses and all orders for this visit:    1. Encounter for gynecological examination without abnormal finding (Primary)  -     IGP, Apt HPV,rfx 16 / 18,45  -     Prenatal Vit-Fe Fumarate-FA (Prenatal/Folic Acid) tablet; Take 1 tablet by mouth Daily.  Dispense: 30 each; Refill: 10    2. Encounter to establish care  -     IGP, Apt HPV,rfx 16 / 18,45    3. Screening for malignant neoplasm of cervix  -     IGP, Apt HPV,rfx 16 / 18,45    4. Urinary frequency  -     POC Urinalysis Dipstick  -     Urine Culture - Urine, Urine, Clean Catch  -     Cancel: Genital Culture - Swab, Cervix; Future  -     Mycoplasma / Ureaplasma Culture - Swab, Cervix  -     NuSwab VG+ & HSV  -     Genital Culture - Swab, Cervix    5. Pelvic pain  -     POC Urinalysis Dipstick  -     Urine Culture - Urine, Urine, Clean Catch  -     Cancel: Genital Culture - Swab, Cervix; Future  -     Mycoplasma / Ureaplasma Culture - Swab, Cervix  -     NuSwab VG+ & HSV  -     Genital Culture - Swab, Cervix  -     Ambulatory Referral to Physical Therapy Evaluate and treat, Pelvic Floor  -     US Pelvis Complete; Future    6. Frequent infections  -     POC Urinalysis Dipstick  -     Urine  Culture - Urine, Urine, Clean Catch  -     Cancel: Genital Culture - Swab, Cervix; Future  -     Mycoplasma / Ureaplasma Culture - Swab, Cervix  -     NuSwab VG+ & HSV  -     Genital Culture - Swab, Cervix         PLAN   Well woman exam: Pap smear collected today. Recommend MVI daily.    Contraception: none, trying to conceive  STD: Enc condoms. Desires STD screen today- Yes. NuSwab  Smoking status: non smoker  Encouraged annual mammogram screening starting at age 40. Instructed on how to perform SBE. Encouraged breast health self awareness.  6.   Encouraged 150 minutes of exercise per week if not medially contraindicated.   7.   BMI is >= 30 and <35. (Class 1 Obesity).  8.   Begin using ovulation prediction kit to assess ovulation.  9.   If there are issues with ovulation, will refer to fertility specialist.   10. Return for complete pelvic ultrasound for pelvic pain.     11. Referral to PT for pelvic floor physical therapy for vaginal tightness/pain.       Return in about 1 week (around 1/12/2024) for Next scheduled follow up for ultrasound with visit with me.      I spent 15 minutes caring for @fname@ on this date of service. This time includes time spent by me in the following activities: preparing for the visit, reviewing tests, obtaining and/or reviewing a separately obtained history, performing a medically appropriate examination and/or evaluation, counseling and educating the patient/family/caregiver, ordering medications, tests, or procedures, referring and communicating with other health care professionals, documenting information in the medical record, and care coordination      Bettina Srivastava CNM  1/5/2024  18:26 EST

## 2024-01-05 ENCOUNTER — OFFICE VISIT (OUTPATIENT)
Dept: OBSTETRICS AND GYNECOLOGY | Facility: CLINIC | Age: 39
End: 2024-01-05
Payer: COMMERCIAL

## 2024-01-05 VITALS
HEIGHT: 60 IN | SYSTOLIC BLOOD PRESSURE: 112 MMHG | WEIGHT: 162.8 LBS | DIASTOLIC BLOOD PRESSURE: 74 MMHG | BODY MASS INDEX: 31.96 KG/M2

## 2024-01-05 DIAGNOSIS — Z86.19 FREQUENT INFECTIONS: ICD-10-CM

## 2024-01-05 DIAGNOSIS — Z01.419 ENCOUNTER FOR GYNECOLOGICAL EXAMINATION WITHOUT ABNORMAL FINDING: Primary | ICD-10-CM

## 2024-01-05 DIAGNOSIS — R10.2 PELVIC PAIN: ICD-10-CM

## 2024-01-05 DIAGNOSIS — Z12.4 SCREENING FOR MALIGNANT NEOPLASM OF CERVIX: ICD-10-CM

## 2024-01-05 DIAGNOSIS — Z76.89 ENCOUNTER TO ESTABLISH CARE: ICD-10-CM

## 2024-01-05 DIAGNOSIS — R35.0 URINARY FREQUENCY: ICD-10-CM

## 2024-01-05 RX ORDER — PRENATAL VIT/IRON FUM/FOLIC AC 27 MG-1 MG
1 TABLET ORAL DAILY
Qty: 30 EACH | Refills: 10 | Status: SHIPPED | OUTPATIENT
Start: 2024-01-05

## 2024-01-07 LAB
BACTERIA UR CULT: NO GROWTH
BACTERIA UR CULT: NORMAL

## 2024-01-07 NOTE — PROGRESS NOTES
"Chief Complaint   Patient presents with    Follow-up     Patient here for follow up visit following an US for pelvic pain.         SUBJECTIVE:     Yuliya Gayle is a 38 y.o.  who presents for follow up for pelvic pain with an ultrasound today for pelvic pain. She also reports that she was mistaken at our last appointment and has actually been trying to get pregnant for around 2 years. After our last appointment she does report purchasing an OPK and starting to use that.     History reviewed. No pertinent past medical history.   History reviewed. No pertinent surgical history.     Review of Systems   All other systems reviewed and are negative.      OBJECTIVE:   Vitals:    24 1133   BP: 127/83   Weight: 73.5 kg (162 lb)   Height: 152.4 cm (60\")        Physical Exam  Constitutional:       General: She is awake.      Appearance: Normal appearance. She is well-developed, well-groomed and normal weight.   HENT:      Head: Normocephalic and atraumatic.   Pulmonary:      Effort: Pulmonary effort is normal.   Musculoskeletal:      Cervical back: Normal range of motion.   Neurological:      General: No focal deficit present.      Mental Status: She is alert and oriented to person, place, and time.   Skin:     General: Skin is warm and dry.   Psychiatric:         Mood and Affect: Mood normal.         Behavior: Behavior normal. Behavior is cooperative.   Vitals reviewed.         ASSESSMENT/PLAN:    Diagnoses and all orders for this visit:    1. Pelvic pain (Primary)        -     Discussed positive cultures following our last visit together and need for treatment for  as well.     2. Infertility management  -     Ambulatory Referral to Infertility  -     DHEA-Sulfate  -     Estrogens, Total  -     FSH & LH  -     Hemoglobin A1c  -     Prolactin  -     Testosterone  -     TSH  -     T4, free  -     CBC Auto Differential  -     CBC & Differential        TRANSVAGINAL ULTRASOUND PRELIMINARY RESULT "     2024    DIAGNOSIS: pelvic pain  UTERUS:  length 8.09 cm, volume: 85.583 cm3   ENDOMETRIAL LININ.08 cm   FIBROIDS/POLYPS: not seen. .   OVARIES: right - 2.32 cm, left - 3.84 cm.   OVARIAN CYSTS/MASSES: seen-left complex ovarian cyst 2.47 x 2.03 cm.  Uterus is anteverted, normal uterus with no obvious endometrial mass seen, right ovary appears normal, left complex ovarian cyst, no free fluid.     COMPARATIVE DATA: not available for examination  Results discussed with patient.  *Preliminary results read by MARYANA Srivastava CNM*         Return if symptoms worsen or fail to improve.    I spent 15 minutes caring for Yuliya on this date of service. This time includes time spent by me in the following activities: preparing for the visit, reviewing tests, obtaining and/or reviewing a separately obtained history, performing a medically appropriate examination and/or evaluation, counseling and educating the patient/family/caregiver, ordering medications, tests, or procedures, referring and communicating with other health care professionals, documenting information in the medical record, and care coordination        Bettina Srivastava CNM  2024  08:43 EST

## 2024-01-08 RX ORDER — PENICILLIN V POTASSIUM 250 MG/1
250 TABLET ORAL 4 TIMES DAILY
Qty: 12 TABLET | Refills: 0 | Status: SHIPPED | OUTPATIENT
Start: 2024-01-08 | End: 2024-01-11

## 2024-01-09 LAB
BACTERIA GENITAL AEROBE CULT: ABNORMAL
BACTERIA SPEC CULT: ABNORMAL
BACTERIA SPEC CULT: ABNORMAL
CYTOLOGIST CVX/VAG CYTO: NORMAL
CYTOLOGY CVX/VAG DOC CYTO: NORMAL
CYTOLOGY CVX/VAG DOC THIN PREP: NORMAL
DX ICD CODE: NORMAL
HIV 1 & 2 AB SER-IMP: NORMAL
HPV I/H RISK 4 DNA CVX QL PROBE+SIG AMP: NEGATIVE
OTHER STN SPEC: NORMAL
STAT OF ADQ CVX/VAG CYTO-IMP: NORMAL

## 2024-01-11 ENCOUNTER — OFFICE VISIT (OUTPATIENT)
Dept: OBSTETRICS AND GYNECOLOGY | Facility: CLINIC | Age: 39
End: 2024-01-11
Payer: COMMERCIAL

## 2024-01-11 VITALS
SYSTOLIC BLOOD PRESSURE: 127 MMHG | HEIGHT: 60 IN | DIASTOLIC BLOOD PRESSURE: 83 MMHG | WEIGHT: 162 LBS | BODY MASS INDEX: 31.8 KG/M2

## 2024-01-11 DIAGNOSIS — Z31.9 INFERTILITY MANAGEMENT: ICD-10-CM

## 2024-01-11 DIAGNOSIS — A59.01 TRICHOMONAL VAGINITIS: Primary | ICD-10-CM

## 2024-01-11 DIAGNOSIS — R10.2 PELVIC PAIN: Primary | ICD-10-CM

## 2024-01-11 DIAGNOSIS — Z22.39 CARRIER OF UREAPLASMA UREALYTICUM: ICD-10-CM

## 2024-01-11 LAB
A VAGINAE DNA VAG QL NAA+PROBE: ABNORMAL SCORE
BVAB2 DNA VAG QL NAA+PROBE: ABNORMAL SCORE
C ALBICANS DNA VAG QL NAA+PROBE: NEGATIVE
C GLABRATA DNA VAG QL NAA+PROBE: NEGATIVE
C TRACH DNA VAG QL NAA+PROBE: NEGATIVE
HSV1 DNA SPEC QL NAA+PROBE: NEGATIVE
HSV2 DNA SPEC QL NAA+PROBE: NEGATIVE
MEGA1 DNA VAG QL NAA+PROBE: ABNORMAL SCORE
N GONORRHOEA DNA VAG QL NAA+PROBE: NEGATIVE
T VAGINALIS DNA VAG QL NAA+PROBE: POSITIVE

## 2024-01-11 RX ORDER — AZITHROMYCIN 500 MG/1
1000 TABLET, FILM COATED ORAL ONCE
Qty: 2 TABLET | Refills: 0 | Status: SHIPPED | OUTPATIENT
Start: 2024-01-11 | End: 2024-01-11

## 2024-01-11 RX ORDER — METRONIDAZOLE 500 MG/1
500 TABLET ORAL 2 TIMES DAILY
Qty: 14 TABLET | Refills: 0 | Status: SHIPPED | OUTPATIENT
Start: 2024-01-11 | End: 2024-01-18

## 2024-01-12 LAB
BASOPHILS # BLD AUTO: 0 X10E3/UL (ref 0–0.2)
BASOPHILS NFR BLD AUTO: 0 %
DHEA-S SERPL-MCNC: 125 UG/DL (ref 57.3–279.2)
EOSINOPHIL # BLD AUTO: 0 X10E3/UL (ref 0–0.4)
EOSINOPHIL NFR BLD AUTO: 0 %
ERYTHROCYTE [DISTWIDTH] IN BLOOD BY AUTOMATED COUNT: 13 % (ref 11.7–15.4)
FSH SERPL-ACNC: 6.1 MIU/ML
HBA1C MFR BLD: 6.1 % (ref 4.8–5.6)
HCT VFR BLD AUTO: 38.6 % (ref 34–46.6)
HGB BLD-MCNC: 12.6 G/DL (ref 11.1–15.9)
IMM GRANULOCYTES # BLD AUTO: 0 X10E3/UL (ref 0–0.1)
IMM GRANULOCYTES NFR BLD AUTO: 0 %
LH SERPL-ACNC: 9.3 MIU/ML
LYMPHOCYTES # BLD AUTO: 2.6 X10E3/UL (ref 0.7–3.1)
LYMPHOCYTES NFR BLD AUTO: 48 %
MCH RBC QN AUTO: 29.4 PG (ref 26.6–33)
MCHC RBC AUTO-ENTMCNC: 32.6 G/DL (ref 31.5–35.7)
MCV RBC AUTO: 90 FL (ref 79–97)
MONOCYTES # BLD AUTO: 0.5 X10E3/UL (ref 0.1–0.9)
MONOCYTES NFR BLD AUTO: 8 %
NEUTROPHILS # BLD AUTO: 2.5 X10E3/UL (ref 1.4–7)
NEUTROPHILS NFR BLD AUTO: 44 %
PLATELET # BLD AUTO: 184 X10E3/UL (ref 150–450)
PROLACTIN SERPL-MCNC: 6.5 NG/ML (ref 4.8–33.4)
RBC # BLD AUTO: 4.29 X10E6/UL (ref 3.77–5.28)
T4 FREE SERPL-MCNC: 1.17 NG/DL (ref 0.82–1.77)
TESTOST SERPL-MCNC: 13 NG/DL (ref 8–60)
TSH SERPL DL<=0.005 MIU/L-ACNC: 0.71 UIU/ML (ref 0.45–4.5)
WBC # BLD AUTO: 5.6 X10E3/UL (ref 3.4–10.8)

## 2024-01-14 LAB
M HOMINIS SPEC QL CULT: NEGATIVE
U UREALYTICUM SPEC QL CULT: POSITIVE

## 2024-01-17 LAB — ESTROGEN SERPL-MCNC: 188 PG/ML

## 2024-01-23 ENCOUNTER — TELEPHONE (OUTPATIENT)
Dept: OBSTETRICS AND GYNECOLOGY | Facility: CLINIC | Age: 39
End: 2024-01-23
Payer: COMMERCIAL

## 2024-01-24 ENCOUNTER — TELEPHONE (OUTPATIENT)
Dept: OBSTETRICS AND GYNECOLOGY | Facility: CLINIC | Age: 39
End: 2024-01-24
Payer: COMMERCIAL

## 2024-01-24 RX ORDER — AZITHROMYCIN 500 MG/1
1000 TABLET, FILM COATED ORAL ONCE
Qty: 2 TABLET | Refills: 0 | Status: SHIPPED | OUTPATIENT
Start: 2024-01-24 | End: 2024-01-24 | Stop reason: SDUPTHER

## 2024-01-24 RX ORDER — AZITHROMYCIN 500 MG/1
1000 TABLET, FILM COATED ORAL ONCE
Qty: 2 TABLET | Refills: 0 | Status: SHIPPED | OUTPATIENT
Start: 2024-01-24 | End: 2024-01-24

## 2024-01-24 NOTE — TELEPHONE ENCOUNTER
Pt called stating she will set up her mychart for you to send the list of treatments to her. Thank you

## 2024-01-24 NOTE — TELEPHONE ENCOUNTER
Returned call to patient - patient reported loss of zithromax tablets. New Rx sent for patient to her pharmacy on file.

## 2024-01-26 ENCOUNTER — TELEPHONE (OUTPATIENT)
Dept: OBSTETRICS AND GYNECOLOGY | Facility: CLINIC | Age: 39
End: 2024-01-26
Payer: COMMERCIAL

## 2024-01-26 NOTE — TELEPHONE ENCOUNTER
----- Message from Yuliya Gayle sent at 1/26/2024  2:49 PM EST -----  Regarding: Follow up question.  Contact: [phone]  Caleb Dunbar,  I would like to know if we have to retest after completing the antibiotics?  We greatly appreciate your help.    Best,  Yuliya Gayle

## 2024-02-04 NOTE — PROGRESS NOTES
"GYN VISIT    Chief Complaint   Patient presents with    Exposure to STD     Pt dx with Trich last month. Here for reswab.      Vaginal Itching     Pt c/o still having vaginal itching    Amenorrhea     Pt states she has not had a period since December.  Having pregnancy symptoms        SUBJECTIVE    Yuliya is a 38 y.o.  who presents today to follow up with treatment of ureaplasma and trichomonas. She also reports that she has been a little bit nauseous lately and has noticed that her last menses was in December. She and  Linda have been trying for pregnancy for some time. I have seen Yuliya prior to today's appointment. At our last visit together we discussed sending her to reproductive endocrinology.      LMP: No LMP recorded.    History reviewed. No pertinent past medical history.     History reviewed. No pertinent surgical history.     Review of Systems   All other systems reviewed and are negative.      OBJECTIVE     Vitals:    24 1252   BP: 108/71   Pulse: 86   Weight: 73.6 kg (162 lb 3.2 oz)   Height: 152.4 cm (60\")        Physical Exam  Constitutional:       General: She is awake.      Appearance: Normal appearance. She is well-developed and well-groomed.   HENT:      Head: Normocephalic and atraumatic.   Pulmonary:      Effort: Pulmonary effort is normal.   Musculoskeletal:      Cervical back: Normal range of motion.   Neurological:      General: No focal deficit present.      Mental Status: She is alert and oriented to person, place, and time.   Skin:     General: Skin is warm and dry.   Psychiatric:         Mood and Affect: Mood normal.         Behavior: Behavior normal. Behavior is cooperative.   Vitals reviewed.         ASSESSMENT/PLAN    Diagnoses and all orders for this visit:    1. Encounter for screening examination for sexually transmitted disease (Primary)  -     Genital Culture - Swab, Vagina  -     Mycoplasma / Ureaplasma Culture - Swab, Cervix  -     NuSwab VG+ - Swab, " Vagina    2. Missed period  -     HCG, B-subunit, Quantitative  -     Progesterone  -     HCG, B-subunit, Quantitative; Future        STD SCREENING: STD desires - NuSwab., encouraged use of condoms.  Plan for HCG today with progesterone and repeat HCG in 48 hours.   Return to care for first prenatal visit and dating ultrasound.       Return for ultrasound and office visit for 1st prenatal visit.    I spent 15 minutes caring for Yuliya on this date of service. This time includes time spent by me in the following activities: preparing for the visit, reviewing tests, obtaining and/or reviewing a separately obtained history, performing a medically appropriate examination and/or evaluation, counseling and educating the patient/family/caregiver, ordering medications, tests, or procedures, referring and communicating with other health care professionals, documenting information in the medical record, independently interpreting results and communicating that information with the patient/family/caregiver, and care coordination    Bettina Srivastava CNM  2/8/2024  18:06 EST

## 2024-02-08 ENCOUNTER — OFFICE VISIT (OUTPATIENT)
Dept: OBSTETRICS AND GYNECOLOGY | Facility: CLINIC | Age: 39
End: 2024-02-08
Payer: COMMERCIAL

## 2024-02-08 VITALS
HEART RATE: 86 BPM | DIASTOLIC BLOOD PRESSURE: 71 MMHG | BODY MASS INDEX: 31.84 KG/M2 | SYSTOLIC BLOOD PRESSURE: 108 MMHG | WEIGHT: 162.2 LBS | HEIGHT: 60 IN

## 2024-02-08 DIAGNOSIS — Z11.3 ENCOUNTER FOR SCREENING EXAMINATION FOR SEXUALLY TRANSMITTED DISEASE: Primary | ICD-10-CM

## 2024-02-08 DIAGNOSIS — N92.6 MISSED PERIOD: ICD-10-CM

## 2024-02-09 ENCOUNTER — TELEPHONE (OUTPATIENT)
Dept: OBSTETRICS AND GYNECOLOGY | Facility: CLINIC | Age: 39
End: 2024-02-09
Payer: COMMERCIAL

## 2024-02-09 LAB
HCG INTACT+B SERPL-ACNC: NORMAL MIU/ML
PROGEST SERPL-MCNC: 23.4 NG/ML

## 2024-02-12 LAB
BACTERIA GENITAL AEROBE CULT: ABNORMAL
BACTERIA SPEC CULT: ABNORMAL
BACTERIA SPEC CULT: ABNORMAL

## 2024-02-12 RX ORDER — PENICILLIN V POTASSIUM 250 MG/1
250 TABLET ORAL 4 TIMES DAILY
Qty: 28 TABLET | Refills: 0 | Status: SHIPPED | OUTPATIENT
Start: 2024-02-12 | End: 2024-02-19

## 2024-02-12 NOTE — PROGRESS NOTES
Also to add pt said the medication seems to be doing the job, told her it looks like you prescribed more of that today so to contact her pharmacy about when it would be ready, it should clear up everything and if does not that's when she'll reach back out for a f/u Gretchen

## 2024-02-13 LAB
A VAGINAE DNA VAG QL NAA+PROBE: ABNORMAL SCORE
BVAB2 DNA VAG QL NAA+PROBE: ABNORMAL SCORE
C ALBICANS DNA VAG QL NAA+PROBE: POSITIVE
C GLABRATA DNA VAG QL NAA+PROBE: NEGATIVE
C TRACH DNA VAG QL NAA+PROBE: NEGATIVE
MEGA1 DNA VAG QL NAA+PROBE: ABNORMAL SCORE
N GONORRHOEA DNA VAG QL NAA+PROBE: NEGATIVE
T VAGINALIS DNA VAG QL NAA+PROBE: NEGATIVE

## 2024-02-13 RX ORDER — AZITHROMYCIN 500 MG/1
500 TABLET, FILM COATED ORAL DAILY
Qty: 6 TABLET | Refills: 0 | Status: SHIPPED | OUTPATIENT
Start: 2024-02-13

## 2024-02-13 RX ORDER — AZITHROMYCIN 500 MG/1
500 TABLET, FILM COATED ORAL DAILY
Qty: 6 TABLET | Refills: 0 | Status: SHIPPED | OUTPATIENT
Start: 2024-02-13 | End: 2024-02-13 | Stop reason: SDUPTHER

## 2024-02-16 LAB
M HOMINIS SPEC QL CULT: NEGATIVE
U UREALYTICUM SPEC QL CULT: POSITIVE

## 2024-02-22 ENCOUNTER — TELEPHONE (OUTPATIENT)
Dept: OBSTETRICS AND GYNECOLOGY | Facility: CLINIC | Age: 39
End: 2024-02-22
Payer: COMMERCIAL

## 2024-02-22 NOTE — TELEPHONE ENCOUNTER
Pt called stated her she is having diarrhea and I let her know what medications are safe to take while pregnant.

## 2024-02-23 ENCOUNTER — TELEPHONE (OUTPATIENT)
Dept: OBSTETRICS AND GYNECOLOGY | Facility: CLINIC | Age: 39
End: 2024-02-23
Payer: COMMERCIAL

## 2024-02-23 NOTE — TELEPHONE ENCOUNTER
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 Pt wanted to know if this vaccine is okay to take while being pregnant?    Please advise, thank you

## 2024-02-29 ENCOUNTER — INITIAL PRENATAL (OUTPATIENT)
Dept: OBSTETRICS AND GYNECOLOGY | Facility: CLINIC | Age: 39
End: 2024-02-29
Payer: COMMERCIAL

## 2024-02-29 VITALS — SYSTOLIC BLOOD PRESSURE: 102 MMHG | DIASTOLIC BLOOD PRESSURE: 65 MMHG | BODY MASS INDEX: 33.44 KG/M2 | WEIGHT: 171.2 LBS

## 2024-02-29 DIAGNOSIS — Z34.91 INITIAL OBSTETRIC VISIT IN FIRST TRIMESTER: Primary | ICD-10-CM

## 2024-02-29 DIAGNOSIS — E66.9 OBESITY (BMI 30-39.9): ICD-10-CM

## 2024-02-29 DIAGNOSIS — Z13.79 GENETIC SCREENING: ICD-10-CM

## 2024-02-29 DIAGNOSIS — B37.31 YEAST INFECTION INVOLVING THE VAGINA AND SURROUNDING AREA: ICD-10-CM

## 2024-02-29 DIAGNOSIS — O09.521 MULTIGRAVIDA OF ADVANCED MATERNAL AGE IN FIRST TRIMESTER: ICD-10-CM

## 2024-02-29 DIAGNOSIS — Z34.91 PRENATAL CARE IN FIRST TRIMESTER: ICD-10-CM

## 2024-02-29 DIAGNOSIS — Z98.891 HISTORY OF C-SECTION: ICD-10-CM

## 2024-02-29 DIAGNOSIS — Z3A.09 9 WEEKS GESTATION OF PREGNANCY: ICD-10-CM

## 2024-02-29 DIAGNOSIS — Z98.891 HISTORY OF VBAC: ICD-10-CM

## 2024-02-29 RX ORDER — NYSTATIN AND TRIAMCINOLONE ACETONIDE 100000; 1 [USP'U]/G; MG/G
1 OINTMENT TOPICAL 2 TIMES DAILY
Qty: 14 G | Refills: 0 | Status: SHIPPED | OUTPATIENT
Start: 2024-02-29 | End: 2024-03-07

## 2024-02-29 NOTE — PATIENT INSTRUCTIONS
Travel During Pregnancy:  Always use seatbelts.  A lap belt should be worn below the abdomen (across the hips) and the shoulder belt should be worn across the center of your chest (between the breasts) away from your neck.  Do not put the shoulder belt under your arm or behind your back.  Pull any slack out of the belt.  Air travel is safe in most uncomplicated pregnancies, but we do not recommend air travel past 36 weeks.  Airlines may also have restrictions, so check with your airline before flying.  For some international flights, the travel cut off may be as early as 28 weeks gestation, and some airlines may require letters from your physician.  When going on long trips in car, plane, train, or bus, frequent ambulation is important to prevent blood clots in legs and/or lungs.  The following may help with prevention of blood clots in legs: Drink lots of fluid, wear loose-fitting clothing, walk and stretch at regular intervals.    Avoid areas with Zika outbreaks.  For the latest information, you may visit: www.cdc.gov/travel/notices/.  Resources: , , Committee Opinion 455  Nutrition during pregnancy  Average weight gain during pregnancy is based on your pre-pregnancy body mass index (BMI).  See below for recommended weight gain:  Underweight (BMI <18.5), we recommend 28 to 40lb weight gain  Normal weight (BMI 18.5 to 24.9), we recommend a 25 to 35lb weight gain  Overweight (BMI 25-29.9), we recommend a 15 to 25lb weight gain  Obese (BMI >30), we recommend keeping weight gain under 20 lbs.    You should speak with your physician regarding your specific weight goals for this pregnancy.   Foods to avoid include:   Fish: avoid certain types of fish such as shark, swordfish, da mackerel, tilefish.  Limit white (albacore) tuna to 6 oz per week.  Choose fish and shellfish such as shrimp, salmon, catfish, [place].  Food-borne illness: Pregnant women are much more likely to get Listeriosis than non-pregnant  women.  To help prevent this, avoid eating unpasteurized milk and unpasteurized milk products, hot dogs/lunch meat/cold cuts unless they are heated until steaming right before serving, refrigerated meat spreads, refrigerated smoked seafood, raw or undercooked seafood/eggs/meat.   Vitamin D helps development of the baby’s bones and teeth.  Good sources of Vitamin D include milk fortified with Vitamin D and fatty fish such as salmon.    Folic acid, also known as folate, helps develop the baby’s brain and spine.  You should make sure your vitamin contains extra folic acid - at least 400mcg.    Iron helps make red blood cells.  You need to make extra red blood cell sin pregnancy.  We recommend eating Iron-rich foods such as lean red meat, poultry, fish, dried beans and peas, iron-fortified cereals, and prune juice.  You may be recommended an iron supplement.  If so, it is absorbed more easily if taken with vitamin C-rich foods such as citrus fruits or tomatoes.    It is important to eat a well balanced diet.  A good recourse for nutrition recommendations is: www.choosemyplate.gov.  Limit caffeine intake to 200mg daily.  Some coffees/teas/sodas have very different levels of caffeine per serving, so check the nutrition labeling.   Resources: , Committee Opinion 548  Exercise during pregnancy  If you are healthy and your pregnancy is normal, it is safe to continue or start most types of exercise.   Physical activity does not increase your risk of miscarriage, low birth weight or early delivery, but you should discuss specific limitations or any complications with your physician.    Benefits of exercise during pregnancy include: reduced back pain, less constipation, promotes overall health and healthy weight gain which may decrease risks for certain pregnancy complications such as diabetes and/or preeclampsia.  The CDC recommends 150 minutes of moderate intensity aerobic exercise per week.  Moderate intensity means  that you are moving enough to raise your heart rate and start sweating, but you can talk normally.  Brisk walking, swimming/water workouts, modified yoga/pilates, and use of elliptical machines and/or stationary bikes are examples of aerobic activity.    Precautions:  Stay hydrated.  Drink plenty of water before, during and after your workout  Wear supportive clothing such as a sports bra  Do not become overheated.  Do not exercise outside when it is very hot or humid  Avoid lying flat on your back as much as possible  AVOID contact sports, skydiving, sports that risk falling (such as skiing, surfing, off-road cycling, gymnastics, horseback riding), hot yoga/hot pilates, scuba diving  Stop exercising if you experience: bleeding from the vagina, feeling dizzy/faint, shortness of breath before starting exercise, chest pain, headache, muscle weakness, calf pain or swelling, regular uterine contractions, fluid leaking from the vagina.    Postpartum exercise: Continuing exercise after you deliver your baby will help boost your energy, strengthen muscles, promote better sleep, and relieve stress.  It also may be useful in preventing postpartum depression.  150 minutes of moderate-intensity aerobic activity is recommended.  Types of exercise and when you can start a regular exercise routine may be limited by the type of delivery you had.  Please discuss with your physician prior to resuming or starting exercise.    Resources: ,   Back pain during pregnancy  Back pain is very common during pregnancy.  It may arise due to strain on your back muscles, weakness of the abdominal muscles, and pregnancy hormones.    To prevent back pain:  Wear shoes with good support. Flat shoes and high heels may not have good arch support.  Consider a firm mattress  Use good lifting practices.  Do not bend from the waist to pick things up.  Squat down and bend your knees, keeping a straight back.  Sit in chairs with good back  "support or use a small pillow behind your lower back.    Sleep on your side with one or two pillows between your legs  To ease back pain:   Exercise can help stretch strained muscles and strengthen weak muscles to promote good posture  Contact your health care provider with severe pain, pain that persists for more than 2 weeks, fever, burning during urination, or vaginal bleeding.  Resources:  B6/Doxylamine  As a single agent, the recommended dose of pyridoxine (also known as B6) is 10 to 25 mg orally every six to eight hours; the maximum treatment dose suggested for pregnant women is 200 mg/day.  In the [place], doxylamine is available in some over-the-counter sleeping pills (eg, Unisom Sleep Tabs) and as a prescription antihistamine chewable tablet (Aldex AN). One-half of the 25 mg over-the-counter tablet or two chewable 5 mg tablets can be used off-label as an antiemetic; pyridoxine 25 mg three to four times per day should be prescribed with it. The 10 mg dose of pyridoxine is not commercially available in the [place].    Other Information:  The Association of Profess[employer] of [employer] Obst[employer] has released a smart phone application that can help you monitor and manage your symptoms.  The Application is \"Managing NVP,\" and has a green icon that says \"APGO WELLMOM.\"         "

## 2024-02-29 NOTE — PROGRESS NOTES
Initial OB Visit    Chief Complaint   Patient presents with    Initial Prenatal Visit     Patient states she has no complaints for today's visit         Yuliya Gayle is being seen today for her first obstetrical visit.  She is a 38 y.o.  at 9w0d gestation by LMP 23.     FOB: Alina Gayle  This is not a planned pregnancy.   OB History    Para Term  AB Living   5 2 2   2 1   SAB IAB Ectopic Molar Multiple Live Births   1   1     1      # Outcome Date GA Lbr John/2nd Weight Sex Delivery Anes PTL Lv   5 Current            4 SAB 2022     SAB      3 Term 10/19/19           2 Ectopic 2019           1 Term 14 37w6d    CS-Unspec   JUANPABLO      Complications: Fetal Intolerance, Failure to Progress in First Stage       Current obstetric complaints: She reports nausea in the mornings along with accompanying light headedness and dizziness. Denies vaginal bleeding. She has intermittent mild abdominal cramping. Denies vomiting.  She is still having symptoms of yeast infection with irritation of the vulva.   Prior obstetric issues, potential pregnancy concerns:    G1- 14- 37w6- C/S arrest of dilation and fetal intolerance of labor   G2- 2019- ectopic pregnancy - miscarried on its own    G3- 10/29/19- 37w6d-  - 5 lb approximately - carpal tunnel syndrome   G4- 2022- SAB- about 12 weeks    G5- current   Family history of genetic issues (includes FOB): denies   Prior infections concerning in pregnancy (Rash, fever since LMP): denies   Varicella Hx: She has received vaccination for varicella.   Prior genetic testing: unsure   History of abnormal pap smears: denies; last pap smear: 24- NILM, negative HPV   History of STIs: Trichomonas in  s/p treatment; History of HSV in self or partner? denies  Prepregnancy weight 171 lb     History reviewed. No pertinent past medical history.    Past Surgical History:   Procedure Laterality Date    BREAST BIOPSY       SECTION          Current Outpatient Medications:     Prenatal Vit-Fe Fumarate-FA (Prenatal/Folic Acid) tablet, Take 1 tablet by mouth Daily., Disp: 30 each, Rfl: 10    azithromycin (ZITHROMAX) 500 MG tablet, Take 1 tablet by mouth Daily. (Patient not taking: Reported on 2/29/2024), Disp: 6 tablet, Rfl: 0    nystatin-triamcinolone (MYCOLOG) 280548-3.1 UNIT/GM-% ointment, Apply 1 Application topically to the appropriate area as directed 2 (Two) Times a Day for 7 days., Disp: 14 g, Rfl: 0    No Known Allergies    Social History     Socioeconomic History    Marital status:    Tobacco Use    Smoking status: Never    Smokeless tobacco: Never   Vaping Use    Vaping Use: Never used   Substance and Sexual Activity    Alcohol use: Never    Drug use: Never    Sexual activity: Yes     Partners: Male       History reviewed. No pertinent family history.    Review of systems     Constitutional: negative for chills, fevers and for fatigue  Eyes: negative  Ears, nose, mouth, throat, and face: negative for hearing loss and nasal congestion  Respiratory: negative for asthma and wheezing  Cardiovascular: negative for chest pain and dyspnea  Gastrointestinal: negative for dyspepsia, dysphagia abdominal pain  Genitourinary:negative for urinary incontinence  Integument/breast: negative for breast lump  Hematologic/lymphatic: negative for bleeding  Musculoskeletal:negative for aches  Neurological: negative for numbness/tingling  Behavioral/Psych: negative for anhedonia  Allergic/Immunologic: negative for rash, allergy    Objective    /65   Wt 77.7 kg (171 lb 3.2 oz)   LMP 12/28/2023   BMI 33.44 kg/m²       General Appearance:    Alert, cooperative, in no acute distress   Head:    Normocephalic, without obvious abnormality, atraumatic   Eyes:            Lids and lashes normal, conjunctivae and sclerae normal, no   icterus, no pallor, corneas clear   Ears:    Ears appear intact with no abnormalities noted   Neck:   No adenopathy,  supple, trachea midline, no thyromegaly   Back:     No kyphosis present, no scoliosis present,                       Lungs:     No increased work of breathing, regular respirations     Heart:    Normal rate    Breast Exam:    No masses, No nipple discharge   Abdomen:     No masses, no organomegaly, soft, non-tender, non-distended, no guarding, no rebound tenderness   Genitalia:    Vulva - BUS-WNL, NEFG, white discoloration of labia majora    Vagina - No discharge, No bleeding    Cervix - No Lesions, closed     Uterus - Consistent with 10 weeks    Adnexa - No masses, NT    Pelvimetry - clinically adequate, gynecoid pelvis     Extremities:   Moves all extremities well, no edema, no cyanosis, no redness   Pulses:   Pulses palpable and equal bilaterally   Skin:   No bleeding, bruising or rash   Lymph nodes:   No palpable adenopathy   Neurologic:   Sensation intact, A&O times 3      Assessment  Pregnancy at 9w0d by LMP consistent with 9 week ultrasound  Prenatal care in first trimester  AMA status  History of C/S x 1  Obesity- BMI 33  Yeast infection of vulva   Genetic screening   History of     Plan    Initial prenatal labs ordered including OB Panel with HIV, Varicella Zoster antibody, TSH, HgbA1c, Hemoglobinopathy Fractionation Cascade, urine culture, urine drug screen, NuSwab VG+, [id number] testing. Patient will obtain blood testing at next visit when > 10 weeks.   Patient has evidence of yeast infection on examination of both vagina and vulva. Patient is using miconazole cream for vaginal infection and I prescribed Mycolog cream to be applied twice a day for 7 days to the vulva for treatment of yeast infection.   Transvaginal ultrasound performed today that noted fetus measuring 10w0d by CRL with fetal cardiac activity which is consistent with LMP dating and TOMMY will remain 10/3/24.   Patient is on Prenatal vitamins.   Problem list reviewed and updated.  Reviewed routine prenatal care with the office to include  but not limited to: not to changing cat litter, food restrictions, avoidance of alcohol, tobacco and drugs and saunas/hot tubs, anticipated weight gain/nutrition requirements.  Reviewed nature of practice and hospital.  Reviewed recommended follow up, importance of compliance with care. We reviewed testing in pregnancy including HIV testing and urine drug screen.    Reviewed aneuploidy screening and CF/SMA screening.  We reviewed limitations of testing, possibility of false positive/negative results, possible need for other tests as indicated. Desires aneuploidy screening and have ordered cell free DNA testing to be performed at the next visit.   Counseled on limitations of ultrasound in pregnancy in detecting aneuploidy/fetal anomalies    We reviewed that at this time, her BMI is classified as BMI 30.0-34.9        Classification: class I obesity.  We reviewed that in pregnancy, her recommended weight gain is 11-20 lb.  In the first trimester, caloric demand is typically not increased.  In the second and third trimester, the increased demand is approximately 350 and 450 calories respectively.    Will plan for serial growth scans every 4 weeks after anatomy survey given AMA status and obesity.     All questions answered.   Return in about 4 weeks (around 3/28/2024) for Prenatal visit.      Chari Carroll MD

## 2024-03-01 PROBLEM — Z34.90 PREGNANCY: Status: ACTIVE | Noted: 2024-03-01

## 2024-03-01 PROBLEM — Z98.891 HISTORY OF VBAC: Status: ACTIVE | Noted: 2024-03-01

## 2024-03-01 PROBLEM — Z98.891 HISTORY OF C-SECTION: Status: ACTIVE | Noted: 2024-03-01

## 2024-03-01 PROBLEM — O09.529 AMA (ADVANCED MATERNAL AGE) MULTIGRAVIDA 35+: Status: ACTIVE | Noted: 2024-03-01

## 2024-03-01 PROBLEM — E66.9 OBESITY (BMI 30-39.9): Status: ACTIVE | Noted: 2024-03-01

## 2024-03-01 LAB
AMPHETAMINES UR QL SCN: NEGATIVE NG/ML
BARBITURATES UR QL SCN: NEGATIVE NG/ML
BENZODIAZ UR QL SCN: NEGATIVE NG/ML
BZE UR QL SCN: NEGATIVE NG/ML
CANNABINOIDS UR QL SCN: NEGATIVE NG/ML
CREAT UR-MCNC: 167.3 MG/DL (ref 20–300)
LABORATORY COMMENT REPORT: NORMAL
METHADONE UR QL SCN: NEGATIVE NG/ML
OPIATES UR QL SCN: NEGATIVE NG/ML
OXYCODONE+OXYMORPHONE UR QL SCN: NEGATIVE NG/ML
PCP UR QL: NEGATIVE NG/ML
PH UR: 7.9 [PH] (ref 4.5–8.9)
PROPOXYPH UR QL SCN: NEGATIVE NG/ML

## 2024-03-02 LAB
BACTERIA UR CULT: NORMAL
BACTERIA UR CULT: NORMAL

## 2024-03-05 ENCOUNTER — TELEPHONE (OUTPATIENT)
Dept: OBSTETRICS AND GYNECOLOGY | Facility: CLINIC | Age: 39
End: 2024-03-05
Payer: COMMERCIAL

## 2024-03-05 DIAGNOSIS — B37.9 YEAST INFECTION: Primary | ICD-10-CM

## 2024-03-05 NOTE — TELEPHONE ENCOUNTER
Pt requesting to speak with provider.   Pt says she was seen at ED yesterday ([facility], in chart). She is now requesting our office provide her a school excuse note since she was in ED.   Is it ok to provide pt this note or will she need to get note from [facility]?    Please advise, thank you!

## 2024-03-07 NOTE — TELEPHONE ENCOUNTER
PT CALLING BACK REGARDING SCHOOL NOTE.    THE NOTE THAT WAS PROVIDED WAS ONLY FOR 3/4/24.    PT WANTS TO BE EXCUSED FOR THE REST OF THE SEMESTER     (3/4/24-4/22/24)

## 2024-03-15 ENCOUNTER — TELEPHONE (OUTPATIENT)
Dept: OBSTETRICS AND GYNECOLOGY | Facility: CLINIC | Age: 39
End: 2024-03-15
Payer: COMMERCIAL

## 2024-03-15 NOTE — TELEPHONE ENCOUNTER
Called patient to change appointment time, patient was okay with change, requested address be sent to [facility].

## 2024-03-26 ENCOUNTER — ROUTINE PRENATAL (OUTPATIENT)
Dept: OBSTETRICS AND GYNECOLOGY | Facility: CLINIC | Age: 39
End: 2024-03-26
Payer: COMMERCIAL

## 2024-03-26 VITALS — DIASTOLIC BLOOD PRESSURE: 74 MMHG | WEIGHT: 171.2 LBS | BODY MASS INDEX: 33.44 KG/M2 | SYSTOLIC BLOOD PRESSURE: 119 MMHG

## 2024-03-26 DIAGNOSIS — Z3A.12 12 WEEKS GESTATION OF PREGNANCY: Primary | ICD-10-CM

## 2024-03-26 DIAGNOSIS — Z98.891 HISTORY OF C-SECTION: ICD-10-CM

## 2024-03-26 DIAGNOSIS — Z34.91 PRENATAL CARE, FIRST TRIMESTER: ICD-10-CM

## 2024-03-26 DIAGNOSIS — Z98.891 HISTORY OF VBAC: ICD-10-CM

## 2024-03-26 DIAGNOSIS — O09.521 MULTIGRAVIDA OF ADVANCED MATERNAL AGE IN FIRST TRIMESTER: ICD-10-CM

## 2024-03-26 DIAGNOSIS — Z13.79 GENETIC SCREENING: ICD-10-CM

## 2024-03-26 DIAGNOSIS — E66.9 OBESITY (BMI 30-39.9): ICD-10-CM

## 2024-03-26 NOTE — PROGRESS NOTES
Chief Complaint   Patient presents with    Routine Prenatal Visit      Yuliya Gayle is a 39 y.o.  at 12w5d who presents for routine prenatal visit. She reports doing well and has no major complaints today. Denies vaginal bleeding or abdominal cramping. Too early for fetal movement.     /74   Wt 77.7 kg (171 lb 3.2 oz)   LMP 2023   BMI 33.44 kg/m²    Gen: well appearing, NAD   Abd: gravid, nontender  See OB Flowsheet    ASSESSMENT:   IUP at 12w5d   Prenatal care in first trimester  AMA status  History of C/s x 1  Obesity- BMI 33  History of    Genetic screening    PLAN:  See updated Problem List   Reviewed expectations of this stage of pregnancy.   First trimester bleeding/pain precautions reviewed.  Discussed aneuploidy screening with cell free DNA testing and patient desires. Ordered [id number] testing.   Return in about 4 weeks (around 2024) for Prenatal visit.    Chari Carroll MD

## 2024-03-31 LAB
CFDNA.FET/CFDNA.TOTAL SFR FETUS: NORMAL %
CITATION REF LAB TEST: NORMAL
FET 13+18+21+X+Y ANEUP PLAS.CFDNA: NEGATIVE
FET CHR 21 TS PLAS.CFDNA QL: NEGATIVE
FET SEX PLAS.CFDNA DOSAGE CFDNA: NORMAL
FET TS 13 RISK PLAS.CFDNA QL: NEGATIVE
FET TS 18 RISK WBC.DNA+CFDNA QL: NEGATIVE
GA EST FROM CONCEPTION DATE: NORMAL D
GESTATIONAL AGE > 9:: YES
LAB DIRECTOR NAME PROVIDER: NORMAL
LAB DIRECTOR NAME PROVIDER: NORMAL
LABORATORY COMMENT REPORT: NORMAL
LIMITATIONS OF THE TEST: NORMAL
NEGATIVE PREDICTIVE VALUE: NORMAL
NOTE: NORMAL
PERFORMANCE CHARACTERISTICS: NORMAL
POSITIVE PREDICTIVE VALUE: NORMAL
REF LAB TEST METHOD: NORMAL
TEST PERFORMANCE INFO SPEC: NORMAL

## 2024-04-01 ENCOUNTER — TELEPHONE (OUTPATIENT)
Dept: OBSTETRICS AND GYNECOLOGY | Facility: CLINIC | Age: 39
End: 2024-04-01
Payer: COMMERCIAL

## 2024-04-23 ENCOUNTER — ROUTINE PRENATAL (OUTPATIENT)
Dept: OBSTETRICS AND GYNECOLOGY | Facility: CLINIC | Age: 39
End: 2024-04-23
Payer: COMMERCIAL

## 2024-04-23 VITALS — WEIGHT: 172 LBS | BODY MASS INDEX: 33.59 KG/M2 | DIASTOLIC BLOOD PRESSURE: 72 MMHG | SYSTOLIC BLOOD PRESSURE: 102 MMHG

## 2024-04-23 DIAGNOSIS — Z13.89 SCREENING FOR BLOOD OR PROTEIN IN URINE: ICD-10-CM

## 2024-04-23 DIAGNOSIS — O09.522 MULTIGRAVIDA OF ADVANCED MATERNAL AGE IN SECOND TRIMESTER: ICD-10-CM

## 2024-04-23 DIAGNOSIS — Z36.89 ENCOUNTER FOR FETAL ANATOMIC SURVEY: ICD-10-CM

## 2024-04-23 DIAGNOSIS — B37.9 YEAST INFECTION: ICD-10-CM

## 2024-04-23 DIAGNOSIS — Z34.92 PRENATAL CARE IN SECOND TRIMESTER: ICD-10-CM

## 2024-04-23 DIAGNOSIS — E66.9 OBESITY (BMI 30-39.9): ICD-10-CM

## 2024-04-23 DIAGNOSIS — Z36.86 ENCOUNTER FOR ANTENATAL SCREENING FOR CERVICAL LENGTH: ICD-10-CM

## 2024-04-23 DIAGNOSIS — Z3A.16 16 WEEKS GESTATION OF PREGNANCY: Primary | ICD-10-CM

## 2024-04-23 DIAGNOSIS — Z98.891 HISTORY OF VBAC: ICD-10-CM

## 2024-04-23 DIAGNOSIS — Z98.891 HISTORY OF C-SECTION: ICD-10-CM

## 2024-04-23 LAB
GLUCOSE UR STRIP-MCNC: NEGATIVE MG/DL
PROT UR STRIP-MCNC: ABNORMAL MG/DL

## 2024-05-13 ENCOUNTER — HOSPITAL ENCOUNTER (EMERGENCY)
Facility: HOSPITAL | Age: 39
Discharge: HOME | End: 2024-05-13
Attending: STUDENT IN AN ORGANIZED HEALTH CARE EDUCATION/TRAINING PROGRAM | Admitting: OBSTETRICS & GYNECOLOGY
Payer: COMMERCIAL

## 2024-05-13 VITALS
DIASTOLIC BLOOD PRESSURE: 62 MMHG | RESPIRATION RATE: 22 BRPM | TEMPERATURE: 98.2 F | HEART RATE: 97 BPM | SYSTOLIC BLOOD PRESSURE: 122 MMHG | OXYGEN SATURATION: 100 %

## 2024-05-13 LAB
A1 MICROGLOB PLACENTAL VAG QL: NEGATIVE
ALBUMIN SERPL-MCNC: 3.5 G/DL (ref 3.5–5.2)
ALBUMIN/GLOB SERPL: 1.2 G/DL
ALP SERPL-CCNC: 89 U/L (ref 39–117)
ALT SERPL W P-5'-P-CCNC: 18 U/L (ref 1–33)
ANION GAP SERPL CALCULATED.3IONS-SCNC: 9.8 MMOL/L (ref 5–15)
AST SERPL-CCNC: 19 U/L (ref 1–32)
BASOPHILS # BLD AUTO: 0.01 10*3/MM3 (ref 0–0.2)
BASOPHILS NFR BLD AUTO: 0.1 % (ref 0–1.5)
BILIRUB SERPL-MCNC: <0.2 MG/DL (ref 0–1.2)
BILIRUB UR QL STRIP: NEGATIVE
BUN SERPL-MCNC: 6 MG/DL (ref 6–20)
BUN/CREAT SERPL: 10.9 (ref 7–25)
CALCIUM SPEC-SCNC: 9.2 MG/DL (ref 8.6–10.5)
CHLORIDE SERPL-SCNC: 106 MMOL/L (ref 98–107)
CLARITY UR: CLEAR
CO2 SERPL-SCNC: 20.2 MMOL/L (ref 22–29)
COLOR UR: YELLOW
CREAT SERPL-MCNC: 0.55 MG/DL (ref 0.57–1)
DEPRECATED RDW RBC AUTO: 44.4 FL (ref 37–54)
EGFRCR SERPLBLD CKD-EPI 2021: 120.5 ML/MIN/1.73
EOSINOPHIL # BLD AUTO: 0.02 10*3/MM3 (ref 0–0.4)
EOSINOPHIL NFR BLD AUTO: 0.2 % (ref 0.3–6.2)
ERYTHROCYTE [DISTWIDTH] IN BLOOD BY AUTOMATED COUNT: 13.2 % (ref 12.3–15.4)
GLOBULIN UR ELPH-MCNC: 2.9 GM/DL
GLUCOSE SERPL-MCNC: 104 MG/DL (ref 65–99)
GLUCOSE UR STRIP-MCNC: NEGATIVE MG/DL
HCT VFR BLD AUTO: 35 % (ref 34–46.6)
HGB BLD-MCNC: 11.6 G/DL (ref 12–15.9)
HGB UR QL STRIP.AUTO: NEGATIVE
IMM GRANULOCYTES # BLD AUTO: 0.06 10*3/MM3 (ref 0–0.05)
IMM GRANULOCYTES NFR BLD AUTO: 0.6 % (ref 0–0.5)
KETONES UR QL STRIP: NEGATIVE
LEUKOCYTE ESTERASE UR QL STRIP.AUTO: NEGATIVE
LYMPHOCYTES # BLD AUTO: 1.73 10*3/MM3 (ref 0.7–3.1)
LYMPHOCYTES NFR BLD AUTO: 17.9 % (ref 19.6–45.3)
MCH RBC QN AUTO: 30.2 PG (ref 26.6–33)
MCHC RBC AUTO-ENTMCNC: 33.1 G/DL (ref 31.5–35.7)
MCV RBC AUTO: 91.1 FL (ref 79–97)
MONOCYTES # BLD AUTO: 0.74 10*3/MM3 (ref 0.1–0.9)
MONOCYTES NFR BLD AUTO: 7.6 % (ref 5–12)
NEUTROPHILS NFR BLD AUTO: 7.12 10*3/MM3 (ref 1.7–7)
NEUTROPHILS NFR BLD AUTO: 73.6 % (ref 42.7–76)
NITRITE UR QL STRIP: NEGATIVE
NRBC BLD AUTO-RTO: 0 /100 WBC (ref 0–0.2)
PH UR STRIP.AUTO: 6.5 [PH] (ref 5–8)
PLATELET # BLD AUTO: 275 10*3/MM3 (ref 140–450)
PMV BLD AUTO: 9.9 FL (ref 6–12)
POTASSIUM SERPL-SCNC: 3.4 MMOL/L (ref 3.5–5.2)
PROT SERPL-MCNC: 6.4 G/DL (ref 6–8.5)
PROT UR QL STRIP: NEGATIVE
RBC # BLD AUTO: 3.84 10*6/MM3 (ref 3.77–5.28)
SODIUM SERPL-SCNC: 136 MMOL/L (ref 136–145)
SP GR UR STRIP: 1.03 (ref 1–1.03)
UROBILINOGEN UR QL STRIP: NORMAL
WBC NRBC COR # BLD AUTO: 9.68 10*3/MM3 (ref 3.4–10.8)

## 2024-05-13 PROCEDURE — 25810000003 LACTATED RINGERS SOLUTION: Performed by: OBSTETRICS & GYNECOLOGY

## 2024-05-13 PROCEDURE — 84112 EVAL AMNIOTIC FLUID PROTEIN: CPT | Performed by: OBSTETRICS & GYNECOLOGY

## 2024-05-13 PROCEDURE — 80053 COMPREHEN METABOLIC PANEL: CPT | Performed by: OBSTETRICS & GYNECOLOGY

## 2024-05-13 PROCEDURE — 96361 HYDRATE IV INFUSION ADD-ON: CPT | Performed by: OBSTETRICS & GYNECOLOGY

## 2024-05-13 PROCEDURE — 85025 COMPLETE CBC W/AUTO DIFF WBC: CPT | Performed by: OBSTETRICS & GYNECOLOGY

## 2024-05-13 PROCEDURE — 99283 EMERGENCY DEPT VISIT LOW MDM: CPT | Performed by: OBSTETRICS & GYNECOLOGY

## 2024-05-13 PROCEDURE — 96374 THER/PROPH/DIAG INJ IV PUSH: CPT | Performed by: OBSTETRICS & GYNECOLOGY

## 2024-05-13 PROCEDURE — 81003 URINALYSIS AUTO W/O SCOPE: CPT | Performed by: OBSTETRICS & GYNECOLOGY

## 2024-05-13 PROCEDURE — 25010000002 MORPHINE PER 10 MG: Performed by: OBSTETRICS & GYNECOLOGY

## 2024-05-13 RX ORDER — MORPHINE SULFATE 2 MG/ML
2 INJECTION, SOLUTION INTRAMUSCULAR; INTRAVENOUS ONCE
Status: COMPLETED | OUTPATIENT
Start: 2024-05-13 | End: 2024-05-13

## 2024-05-13 RX ADMIN — MORPHINE SULFATE 2 MG: 2 INJECTION, SOLUTION INTRAMUSCULAR; INTRAVENOUS at 10:11

## 2024-05-13 RX ADMIN — SODIUM CHLORIDE, POTASSIUM CHLORIDE, SODIUM LACTATE AND CALCIUM CHLORIDE 1000 ML: 600; 310; 30; 20 INJECTION, SOLUTION INTRAVENOUS at 10:04

## 2024-05-13 NOTE — NURSING NOTE
Discharged to home. Encouraged to keep next OB appointment. Return to Labor and Delivery if contractions are less than 5 minutes apart and are getting stronger in intensity, leaking of fluid. vaginal bleeding or decreased fetal movement.Labor warning signs reviewed.

## 2024-05-13 NOTE — OBED NOTES
DO Note [facility]        Patient Name: Yuliya Gayle  YOB: 1985  MRN: [id number]  Admission Date: 2024  9:22 AM  Date of Service: 2024    Chief Complaint: Contractions        Subjective     Yuliya Gayle is a 39 y.o. female  at 19w4d with Estimated Date of Delivery: 10/3/24 who presents with the chief complaint of severe lower abdominal pain that feels like menstrual cramps.  The pain started last night and kept the patient from sleeping well.  She rates the pain 10/10.  She does not remember when the pain became bad enough to come to the hospital.  She denies seeing any bleeding but reports frequent need to urinate.  She is not certain if she is leaking fluid.  She sees Chari Carroll MD for her prenatal care. Her pregnancy has been complicated by:  advanced maternal age, previous  section followed by .            Objective   Patient Active Problem List    Diagnosis     Obesity (BMI 30-39.9) [E66.9]     History of  [Z98.891]     AMA (advanced maternal age) multigravida 35+ [O09.529]     Pregnancy [Z34.90]     History of  [Z98.891]         OB History    Para Term  AB Living   5 2 2 0 2 2   SAB IAB Ectopic Molar Multiple Live Births   1 0 1 0 0 2      # Outcome Date GA Lbr John/2nd Weight Sex Type Anes PTL Lv   5 Current            4 SAB 2022     SAB      3 Term 10/19/19        JUANPABLO   2 Ectopic 2019           1 Term 14 37w6d    CS-Unspec   JUANPABLO      Complications: Fetal Intolerance, Failure to Progress in First Stage        No past medical history on file.    Past Surgical History:   Procedure Laterality Date    BREAST BIOPSY       SECTION         No current facility-administered medications on file prior to encounter.     Current Outpatient Medications on File Prior to Encounter   Medication Sig Dispense Refill    Prenatal Vit-Fe Fumarate-FA (Prenatal/Folic Acid) tablet Take 1 tablet by mouth Daily. 30 each 10       No Known  Allergies    Family History   Problem Relation Age of Onset    No Known Problems Mother     Hypertension Father     No Known Problems Sister     No Known Problems Brother     No Known Problems Daughter     No Known Problems Son     No Known Problems Maternal Aunt     No Known Problems Maternal Uncle     No Known Problems Paternal Aunt     No Known Problems Paternal Uncle     No Known Problems Maternal Grandmother     No Known Problems Maternal Grandfather     No Known Problems Paternal Grandmother     No Known Problems Paternal Grandfather        Social History     Socioeconomic History    Marital status:      Spouse name: mary duong    Number of children: 2    Highest education level: Some college, no degree   Tobacco Use    Smoking status: Never    Smokeless tobacco: Never   Vaping Use    Vaping status: Never Used   Substance and Sexual Activity    Alcohol use: Never    Drug use: Never    Sexual activity: Yes     Partners: Male           Review of Systems   Constitutional: Negative.    HENT: Negative.     Respiratory: Negative.     Cardiovascular: Negative.    Gastrointestinal:  Positive for abdominal pain.   Genitourinary:  Positive for pelvic pain, urgency and vaginal discharge. Negative for vaginal bleeding.   Musculoskeletal:  Positive for back pain.   Neurological: Negative.         VITAL SIGNS:  Vitals:    05/13/24 0930 05/13/24 0935 05/13/24 0946 05/13/24 0950   BP:       Pulse: 117 115 87 97   Resp:   22    Temp:   98.2 °F (36.8 °C)    SpO2: 100% 100% 100% 100%      /62    FHR:                 150 bpm        PHYSICAL EXAM:    General: well developed; well nourished  Appears in severe pain, has difficulty staying still in bed   Heart: regular rate and rhythm, S1, S2 normal, no murmur, click, rub or gallop   Lungs  : breathing is unlabored     Abdomen: soft, non-tender; no masses       Cervix: was checked (by me): 0 cm / 0 % / Floating  Cervical Dilation (cm): 0  Speculum exam:  no blood in  vaginal vault.  Some watery dischage.  Speculum brushed cervix and caused very scant bleeding.   Contractions: none        Extremities: peripheral pulses normal, no pedal edema, no clubbing or cyanosis      LABS AND TESTING ORDERED:  Uterine and fetal monitoring  Urinalysis by straight catheter  CBC, CMP    LAB RESULTS:    Recent Results (from the past 24 hour(s))   Rapid Assay For ROM - Amniotic Fluid, Amniotic Sac    Collection Time: 05/13/24  9:54 AM    Specimen: Amniotic Sac; Amniotic Fluid   Result Value Ref Range    Rupture of Membranes Negative Negative   Urinalysis With Culture If Indicated - Straight Cath    Collection Time: 05/13/24  9:56 AM    Specimen: Straight Cath; Urine   Result Value Ref Range    Color, UA Yellow Yellow, Straw    Appearance, UA Clear Clear    pH, UA 6.5 5.0 - 8.0    Specific Gravity, UA 1.026 1.005 - 1.030    Glucose, UA Negative Negative    Ketones, UA Negative Negative    Bilirubin, UA Negative Negative    Blood, UA Negative Negative    Protein, UA Negative Negative    Leuk Esterase, UA Negative Negative    Nitrite, UA Negative Negative    Urobilinogen, UA 1.0 E.U./dL 0.2 - 1.0 E.U./dL   Comprehensive Metabolic Panel    Collection Time: 05/13/24 10:06 AM    Specimen: Blood   Result Value Ref Range    Glucose 104 (H) 65 - 99 mg/dL    BUN 6 6 - 20 mg/dL    Creatinine 0.55 (L) 0.57 - 1.00 mg/dL    Sodium 136 136 - 145 mmol/L    Potassium 3.4 (L) 3.5 - 5.2 mmol/L    Chloride 106 98 - 107 mmol/L    CO2 20.2 (L) 22.0 - 29.0 mmol/L    Calcium 9.2 8.6 - 10.5 mg/dL    Total Protein 6.4 6.0 - 8.5 g/dL    Albumin 3.5 3.5 - 5.2 g/dL    ALT (SGPT) 18 1 - 33 U/L    AST (SGOT) 19 1 - 32 U/L    Alkaline Phosphatase 89 39 - 117 U/L    Total Bilirubin <0.2 0.0 - 1.2 mg/dL    Globulin 2.9 gm/dL    A/G Ratio 1.2 g/dL    BUN/Creatinine Ratio 10.9 7.0 - 25.0    Anion Gap 9.8 5.0 - 15.0 mmol/L    eGFR 120.5 >60.0 mL/min/1.73   CBC Auto Differential    Collection Time: 05/13/24 10:06 AM    Specimen: Blood  "  Result Value Ref Range    WBC 9.68 3.40 - 10.80 10*3/mm3    RBC 3.84 3.77 - 5.28 10*6/mm3    Hemoglobin 11.6 (L) 12.0 - 15.9 g/dL    Hematocrit 35.0 34.0 - 46.6 %    MCV 91.1 79.0 - 97.0 fL    MCH 30.2 26.6 - 33.0 pg    MCHC 33.1 31.5 - 35.7 g/dL    RDW 13.2 12.3 - 15.4 %    RDW-SD 44.4 37.0 - 54.0 fl    MPV 9.9 6.0 - 12.0 fL    Platelets 275 140 - 450 10*3/mm3    Neutrophil % 73.6 42.7 - 76.0 %    Lymphocyte % 17.9 (L) 19.6 - 45.3 %    Monocyte % 7.6 5.0 - 12.0 %    Eosinophil % 0.2 (L) 0.3 - 6.2 %    Basophil % 0.1 0.0 - 1.5 %    Immature Grans % 0.6 (H) 0.0 - 0.5 %    Neutrophils, Absolute 7.12 (H) 1.70 - 7.00 10*3/mm3    Lymphocytes, Absolute 1.73 0.70 - 3.10 10*3/mm3    Monocytes, Absolute 0.74 0.10 - 0.90 10*3/mm3    Eosinophils, Absolute 0.02 0.00 - 0.40 10*3/mm3    Basophils, Absolute 0.01 0.00 - 0.20 10*3/mm3    Immature Grans, Absolute 0.06 (H) 0.00 - 0.05 10*3/mm3    nRBC 0.0 0.0 - 0.2 /100 WBC       No results found for: \"ABO\", \"RH\"    No results found for: \"STREPGPB\"              Assessment & Plan     ASSESSMENT/PLAN:  Yuliya Gayle is a 39 y.o. female  at 19w4d who presented with: lower abdominal pain          Final Impression:  Pregnancy at 19w4d  cramping  Maternal vital signs were reviewed and were unremarkable              Vitals:    24 0930 24 0935 24 0946 24 0950   BP:       Pulse: 117 115 87 97   Resp:   22    Temp:   98.2 °F (36.8 °C)    SpO2: 100% 100% 100% 100%         PLAN:     Patient comfortable after 1 liter IV fluid bolus and morphine 2 mg.  She will be discharged home.  She has a ride home with a relative.  Patient will keep her scheduled outpatient prenatal appointment.    I have spent 30 minutes including face to face time with the patient, greater than 50% in discussion of the diagnosis (counseling) and/or coordination of care.     Kyara Weinstein MD  2024  11:32 EDT  OB Hospitalist  Phone:  [phone]  "

## 2024-05-21 ENCOUNTER — REFERRAL TRIAGE (OUTPATIENT)
Dept: LABOR AND DELIVERY | Facility: HOSPITAL | Age: 39
End: 2024-05-21
Payer: COMMERCIAL

## 2024-05-22 ENCOUNTER — ROUTINE PRENATAL (OUTPATIENT)
Dept: OBSTETRICS AND GYNECOLOGY | Facility: CLINIC | Age: 39
End: 2024-05-22
Payer: COMMERCIAL

## 2024-05-22 VITALS — BODY MASS INDEX: 34.06 KG/M2 | WEIGHT: 174.4 LBS | DIASTOLIC BLOOD PRESSURE: 76 MMHG | SYSTOLIC BLOOD PRESSURE: 111 MMHG

## 2024-05-22 DIAGNOSIS — Z36.86 ENCOUNTER FOR ANTENATAL SCREENING FOR CERVICAL LENGTH: ICD-10-CM

## 2024-05-22 DIAGNOSIS — Z34.92 PRENATAL CARE IN SECOND TRIMESTER: ICD-10-CM

## 2024-05-22 DIAGNOSIS — Z98.891 HISTORY OF VBAC: ICD-10-CM

## 2024-05-22 DIAGNOSIS — Z3A.20 20 WEEKS GESTATION OF PREGNANCY: Primary | ICD-10-CM

## 2024-05-22 DIAGNOSIS — Z36.89 ENCOUNTER FOR ULTRASOUND TO ASSESS FETAL GROWTH: ICD-10-CM

## 2024-05-22 DIAGNOSIS — Z98.891 HISTORY OF C-SECTION: ICD-10-CM

## 2024-05-22 DIAGNOSIS — O09.523 MULTIGRAVIDA OF ADVANCED MATERNAL AGE IN THIRD TRIMESTER: ICD-10-CM

## 2024-05-22 DIAGNOSIS — O99.210 OBESITY AFFECTING PREGNANCY, ANTEPARTUM, UNSPECIFIED OBESITY TYPE: ICD-10-CM

## 2024-05-22 DIAGNOSIS — Z36.89 ENCOUNTER FOR FETAL ANATOMIC SURVEY: ICD-10-CM

## 2024-05-22 RX ORDER — FLUCONAZOLE 150 MG/1
150 TABLET ORAL
Qty: 2 TABLET | Refills: 0 | Status: SHIPPED | OUTPATIENT
Start: 2024-05-22

## 2024-05-22 NOTE — PROGRESS NOTES
Chief Complaint   Patient presents with    Routine Prenatal Visit      Yuliya Gayle is a 39 y.o.  at 20w6d who presents of routine prenatal visit. She reports doing well and has no major complaints.  Denies vaginal bleeding, cramping, contractions, LOF. She reports feeling active fetal movement.     /76   Wt 79.1 kg (174 lb 6.4 oz)   LMP 2023   BMI 34.06 kg/m²    Gen: well appearing, NAD   Abd: gravid, nontender  See OB Flowsheet    ASSESSMENT:   IUP at 20w6d   Prenatal care in second trimester  AMA status  History of C/s x 1  Obesity- BMI 33  History of    Encounter for fetal anatomy survey  Encounter for cervical length screening     PLAN:  Problem list reviewed and updated.   Reviewed expectations of this stage of pregnancy.   Second trimester precautions reviewed including  labor precautions, anticipated fetal movements.   Discussed ultrasound results that demonstrated normal fetal anatomy survey, normal cervical length screening measuring 5.15 cm, anterior placenta above os, cephalic presentation,  bpm, DVP 5.92 cm,  (measuring 21w2d).   Patient is traveling to [place] in the next 1-2 weeks and we reviewed warning precautions while flying and overseas.   Will obtain fetal growth ultrasound at next visit given obesity and AMA status.   Will obtain 1h GTT, CBC, and T. Pallidum antibody at next visit.   Return in about 5 weeks (around 2024) for 1 h GTT, Prenatal visit, growth ultrasound.    Patient Active Problem List    Diagnosis Date Noted    Obesity (BMI 30-39.9) 2024    History of  2024    AMA (advanced maternal age) multigravida 35+ 2024    Pregnancy 2024    History of  2024       Orders Placed This Encounter   Procedures    US Ob Follow Up Transabdominal Approach     Standing Status:   Future     Standing Expiration Date:   2025     Order Specific Question:   Reason for Exam:     Answer:   fetal growth  assessment, AMA, obesity     Order Specific Question:   Release to patient     Answer:   Routine Release [[id number]]     Chari Carroll MD

## 2024-05-23 ENCOUNTER — PATIENT OUTREACH (OUTPATIENT)
Dept: LABOR AND DELIVERY | Facility: HOSPITAL | Age: 39
End: 2024-05-23
Payer: COMMERCIAL

## 2024-05-23 NOTE — OUTREACH NOTE
[facility]  Enrollment    Current Estimated Gestational Age: 21w0d    Questions/Answers      Flowsheet Row Responses   Would like to participate? Yes   Date of Intake Visit 24        [facility]  Intake    Current Estimated Gestational Age: 21w0d    Intake Assessment      Flowsheet Row Responses   Best Method for Contacting Cell   Currently Employed Yes  []   Able to keep appointments as scheduled Yes   Gender(s) and Name(s) girl   Do you have a dentist? Yes   Have you seen a dentist in the last 6 months Yes   Resources Presently Utilizing: WIC (Women, Infant, Children)   Maternal Warning Signs Provided   Other Education WIC Benefits, Insurance benefits/Incentives            Learning Assessment      Flowsheet Row Responses   Relationship Patient   Learner Name Yuliya   Does the learner have any barriers to learning? No Barriers   What is the preferred language of the learner for medical teaching? English   Is an  required? No   How does the learner prefer to learn new concepts? Listening, Reading, Pictures/Video          Intake completed today. She lives with her  and children although her  is travelling for work at this time. She reports stable housing and reliable transportation. She works and is in school and has a school trip coming up in  to [facility]. She has ordered her car seat from [employer] and has WIC. Hoping for another . F/u in one month.     Bea Rodrigues RN  Maternity Nurse Navigator    2024, 15:06 EDT

## 2024-06-25 ENCOUNTER — PATIENT OUTREACH (OUTPATIENT)
Dept: LABOR AND DELIVERY | Facility: HOSPITAL | Age: 39
End: 2024-06-25
Payer: COMMERCIAL

## 2024-06-25 NOTE — OUTREACH NOTE
[facility]  Check-In    Current Estimated Gestational Age: 25w5d      Questions/Answers      Flowsheet Row Responses   Best Method for Contacting Cell   Demographics Reviewed Yes   Currently Employed Yes   Able to keep appointments as scheduled Yes   Gender(s) and Name(s) girl   Baby Active/Feeling Fetal Movemen Yes   How are you presently feeling? good   Supplies ready for baby Car Seat   Resource/Environmental Concerns None   Do you have any questions related to your care experience, your pregnancy, plans for delivery, any concerns, etc? No   Other Education [facility] Benefits, Insurance benefits/Incentives          Pt is back from trip and reports that everything is going well. Her car seat arrived from [employer]. She will work on ordering her breast pump. She reports active fetal movement. F/u in one month.     Bea Rodrigues RN  Maternity Nurse Navigator    6/25/2024, 14:38 EDT

## 2024-06-26 ENCOUNTER — ROUTINE PRENATAL (OUTPATIENT)
Dept: OBSTETRICS AND GYNECOLOGY | Facility: CLINIC | Age: 39
End: 2024-06-26
Payer: COMMERCIAL

## 2024-06-26 VITALS — DIASTOLIC BLOOD PRESSURE: 73 MMHG | BODY MASS INDEX: 34.92 KG/M2 | WEIGHT: 178.8 LBS | SYSTOLIC BLOOD PRESSURE: 114 MMHG

## 2024-06-26 DIAGNOSIS — Z98.891 HISTORY OF C-SECTION: ICD-10-CM

## 2024-06-26 DIAGNOSIS — Z98.891 HISTORY OF VBAC: ICD-10-CM

## 2024-06-26 DIAGNOSIS — Z34.92 PRENATAL CARE IN SECOND TRIMESTER: ICD-10-CM

## 2024-06-26 DIAGNOSIS — E66.9 OBESITY (BMI 30-39.9): ICD-10-CM

## 2024-06-26 DIAGNOSIS — O09.522 MULTIGRAVIDA OF ADVANCED MATERNAL AGE IN SECOND TRIMESTER: ICD-10-CM

## 2024-06-26 DIAGNOSIS — Z3A.25 25 WEEKS GESTATION OF PREGNANCY: Primary | ICD-10-CM

## 2024-06-26 DIAGNOSIS — Z36.89 ENCOUNTER FOR ULTRASOUND TO ASSESS FETAL GROWTH: ICD-10-CM

## 2024-06-26 LAB
GLUCOSE UR STRIP-MCNC: NEGATIVE MG/DL
PROT UR STRIP-MCNC: NEGATIVE MG/DL

## 2024-06-26 PROCEDURE — 99214 OFFICE O/P EST MOD 30 MIN: CPT | Performed by: STUDENT IN AN ORGANIZED HEALTH CARE EDUCATION/TRAINING PROGRAM

## 2024-06-26 NOTE — PROGRESS NOTES
Chief Complaint   Patient presents with    Routine Prenatal Visit      Yuliya Gayle is a 39 y.o.  at 25w6d who presents of routine prenatal visit. She reports doing well and has no major complaints today. Denies vaginal bleeding, cramping, contractions, LOF. She reports feeling active fetal movement. She reports that she just returned from  trip that was very exciting and fun. She does feel tired.     /73   Wt 81.1 kg (178 lb 12.8 oz)   LMP 2023   BMI 34.92 kg/m²    Gen: well appearing, NAD   Abd: gravid, nontender  See OB Flowsheet    ASSESSMENT:   IUP at 25w6d   Prenatal care in second trimester  AMA status  History of C/s x 1  Obesity- BMI 33  History of    Fetal growth assessment    PLAN:  Problem list reviewed and updated.   Reviewed expectations of this stage of pregnancy.   Second trimester precautions reviewed including  labor precautions, anticipated fetal movements.   Discussed ultrasound results that demonstrated normal fetal growth with  g (55%ile, AC 40%ile), MVP 6.40 cm,  bpm, anterior placenta, cephalic presentation. Will continue serial growths scans every 4 weeks until delivery and plan for  testing at 34-36 weeks with weekly BPPs until delivery.   Collected 1h GTT, CBC, T. Pallidum Antibody today.  Will offer Tdap vaccination at next visit.   Return in about 4 weeks (around 2024) for Prenatal visit, growth ultrasound.    Patient Active Problem List    Diagnosis Date Noted    Obesity (BMI 30-39.9) 2024    History of  2024    AMA (advanced maternal age) multigravida 35+ 2024    Pregnancy 2024    History of  2024       Orders Placed This Encounter   Procedures    US Ob Follow Up Transabdominal Approach     Standing Status:   Future     Standing Expiration Date:   2025     Order Specific Question:   Reason for Exam:     Answer:   fetal growth assessment, AMA, obesity     Order Specific  Question:   Release to patient     Answer:   Routine Release [[id number]]    Gestational Screen 1 Hr ([facility])     Order Specific Question:   Release to patient     Answer:   Routine Release [1400000002]    CBC (No Diff)     Order Specific Question:   Release to patient     Answer:   Routine Release [1400000002]    T Pallidum Antibody w/ reflex RPR (Syphilis)     Order Specific Question:   Release to patient     Answer:   Routine Release [1400000002]     Chari Carroll MD

## 2024-06-27 LAB — GLUCOSE 1H P 50 G GLC PO SERPL-MCNC: 82 MG/DL (ref 70–139)

## 2024-07-10 ENCOUNTER — TELEPHONE (OUTPATIENT)
Dept: OBSTETRICS AND GYNECOLOGY | Facility: CLINIC | Age: 39
End: 2024-07-10
Payer: COMMERCIAL

## 2024-07-10 ENCOUNTER — HOSPITAL ENCOUNTER (EMERGENCY)
Facility: HOSPITAL | Age: 39
Discharge: HOME | End: 2024-07-10
Attending: STUDENT IN AN ORGANIZED HEALTH CARE EDUCATION/TRAINING PROGRAM | Admitting: OBSTETRICS & GYNECOLOGY
Payer: COMMERCIAL

## 2024-07-10 VITALS
WEIGHT: 180 LBS | BODY MASS INDEX: 35.15 KG/M2 | HEART RATE: 97 BPM | OXYGEN SATURATION: 100 % | TEMPERATURE: 98.4 F | RESPIRATION RATE: 16 BRPM | DIASTOLIC BLOOD PRESSURE: 60 MMHG | SYSTOLIC BLOOD PRESSURE: 109 MMHG

## 2024-07-10 LAB
BACTERIA UR QL AUTO: ABNORMAL /HPF
BILIRUB UR QL STRIP: NEGATIVE
CLARITY UR: CLEAR
CLUE CELLS SPEC QL WET PREP: ABNORMAL
COLOR UR: YELLOW
GLUCOSE UR STRIP-MCNC: NEGATIVE MG/DL
HGB UR QL STRIP.AUTO: NEGATIVE
HYALINE CASTS UR QL AUTO: ABNORMAL /LPF
HYDATID CYST SPEC WET PREP: ABNORMAL
KETONES UR QL STRIP: NEGATIVE
LEUKOCYTE ESTERASE UR QL STRIP.AUTO: ABNORMAL
NITRITE UR QL STRIP: NEGATIVE
PH UR STRIP.AUTO: 6.5 [PH] (ref 5–8)
PROT UR QL STRIP: NEGATIVE
RBC # UR STRIP: ABNORMAL /HPF
REF LAB TEST METHOD: ABNORMAL
SP GR UR STRIP: 1.02 (ref 1–1.03)
SQUAMOUS #/AREA URNS HPF: ABNORMAL /HPF
T VAGINALIS SPEC QL WET PREP: ABNORMAL
UROBILINOGEN UR QL STRIP: ABNORMAL
WBC # UR STRIP: ABNORMAL /HPF
WBC SPEC QL WET PREP: ABNORMAL
YEAST GENITAL QL WET PREP: ABNORMAL

## 2024-07-10 PROCEDURE — 87210 SMEAR WET MOUNT SALINE/INK: CPT | Performed by: OBSTETRICS & GYNECOLOGY

## 2024-07-10 PROCEDURE — 87086 URINE CULTURE/COLONY COUNT: CPT | Performed by: OBSTETRICS & GYNECOLOGY

## 2024-07-10 PROCEDURE — 99284 EMERGENCY DEPT VISIT MOD MDM: CPT | Performed by: OBSTETRICS & GYNECOLOGY

## 2024-07-10 PROCEDURE — 81001 URINALYSIS AUTO W/SCOPE: CPT | Performed by: OBSTETRICS & GYNECOLOGY

## 2024-07-10 NOTE — OBED NOTES
[facility]  Yuliya Gayle  : 1985  MRN: [id number]  CSN: [id number]    OB ED Provider Note    Subjective   Chief Complaint   Patient presents with    Abdominal Pain     Right lower quad pain that radiates to back and down right leg.    Vaginal Discharge     C/o vaginal discharge, itching and pain. Was treated for STI 6 months ago.      Yuliya Gayle is a 39 y.o. year old  with an Estimated Date of Delivery: 10/3/24 currently at 27w6d presenting with 2-3 day history of vaginal itching and burning with a white vaginal D/C. She has been treated for VVC, several times during the pregnancy, and ureaplasma once.  She denies CTX, ROM or VB. FM is present.    Prenatal care has been with Dr. Carroll.  It has been complicated by previous  and GBS carriage .    OB History    Para Term  AB Living   5 2 2 0 2 2   SAB IAB Ectopic Molar Multiple Live Births   1 0 1 0 0 2      # Outcome Date GA Lbr John/2nd Weight Sex Type Anes PTL Lv   5 Current            4 SAB 2022     SAB      3 Term 10/19/19        JUANPABLO   2 Ectopic 2019           1 Term 14 37w6d    CS-Unspec   JUANPABLO      Complications: Fetal Intolerance, Failure to Progress in First Stage     Past Medical History:   Diagnosis Date    Urinary tract infection     Urogenital trichomoniasis      Past Surgical History:   Procedure Laterality Date    BREAST BIOPSY       SECTION       No current facility-administered medications for this encounter.    No Known Allergies  Social History    Tobacco Use      Smoking status: Never      Smokeless tobacco: Never    Review of Systems   Genitourinary:  Positive for vaginal discharge.        Vaginal itching and burning   All other systems reviewed and are negative.        Objective   /60   Pulse 97   Temp 98.4 °F (36.9 °C) (Oral)   Resp 16   Wt 81.6 kg (180 lb)   LMP 2023   SpO2 100%   BMI 35.15 kg/m²   General: well developed; well nourished  no acute distress    Abdomen: soft, non-tender; no masses  gravid    FHT's: Discontinuous secondary to repeated matenal movement, NR but gestational age appropriate      Cervix: was not checked. Normal external genitalia with green D/C present   Presentation: Unable to appreciate    Contractions: none   Chest: Unlabored respirations    CV:  RRR   Ext:   No C/C/E   Back: CVA tenderness is deferred bilateral        Prenatal Labs  Lab Results   Component Value Date    HGB 11.6 (L) 05/13/2024    ABORH O Positive 02/23/2022    ABSCRN Negative 02/23/2022    GCT 82 06/26/2024    URINECX Final report 02/29/2024    CHLAMNAA Negative 02/29/2024    NGONORRHON Negative 02/29/2024       Current Labs Reviewed   UA:    Lab Results   Component Value Date    SQUAMEPIUA 7-12 (A) 07/10/2024    SPECGRAVUR 1.020 07/10/2024    KETONESU Negative 07/10/2024    BLOODU Negative 07/10/2024    LEUKOCYTESUR Large (3+) (A) 07/10/2024    NITRITEU Negative 07/10/2024    RBCUA 0-2 07/10/2024    WBCUA 11-20 (A) 07/10/2024    BACTERIA 1+ (A) 07/10/2024     Wet prep + for yeast     Assessment   IUP at 27w6d  Recurrent vulvovaginal candidiasis     Plan   Await 1 hour glucose test. Rx for terazol 7 sent. Should symptoms recur, she may benefit from fungal culture with sensitivities. I explained that Dr. Carroll's office has more options for multipathogen testing than we have in an ER setting, so she would be best served to be tested in an office setting.     Cleopatra Cornejo MD  7/10/2024  03:37 EDT

## 2024-07-11 LAB — BACTERIA SPEC AEROBE CULT: ABNORMAL

## 2024-07-11 NOTE — TELEPHONE ENCOUNTER
Please let her know that she can wait to her appointment on 7/24/24 unless her symptoms do not improve then I can see her earlier. Thanks!

## 2024-07-11 NOTE — TELEPHONE ENCOUNTER
[employer] message sent to patient that she can keep her appt on 7/24/24 per Dr. Carroll.

## 2024-07-15 ENCOUNTER — TELEPHONE (OUTPATIENT)
Dept: OBSTETRICS AND GYNECOLOGY | Facility: CLINIC | Age: 39
End: 2024-07-15
Payer: COMMERCIAL

## 2024-07-15 NOTE — TELEPHONE ENCOUNTER
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     She is needing: flu shot, Hep B, MMR, & tdap    Please advise,   Thank you!

## 2024-07-15 NOTE — TELEPHONE ENCOUNTER
She can receive the Flu shot, Hep B and Tdap vaccine. MMR should not be administered during pregnancy. Thanks!

## 2024-07-23 ENCOUNTER — PATIENT OUTREACH (OUTPATIENT)
Dept: LABOR AND DELIVERY | Facility: HOSPITAL | Age: 39
End: 2024-07-23
Payer: COMMERCIAL

## 2024-07-23 NOTE — OUTREACH NOTE
[facility]  Check-In    Current Estimated Gestational Age: 29w5d      Questions/Answers      Flowsheet Row Responses   Best Method for Contacting Cell   Demographics Reviewed Yes   Currently Employed Yes  [part time]   Able to keep appointments as scheduled Yes   Gender(s) and Name(s) girl   Baby Active/Feeling Fetal Movemen Yes   How are you presently feeling? good   Supplies ready for baby Car Seat, Clothing, Crib, Diapers, Feeding Supplies   Resource/Environmental Concerns None   Do you have any questions related to your care experience, your pregnancy, plans for delivery, any concerns, etc? No   Other Education Insurance benefits/Incentives, [facility] Benefits          Reports that she is doing well, she is working part time and off school for the summer. She has all necessary items for the infant and will wait to get her breast pump while IP. She reports she feels fetal movement mostly in the evening.  is still away for work but will return in time for due date. F/u around 35 weeks.     Bea Rodrigues RN  Maternity Nurse Navigator    7/23/2024, 13:22 EDT

## 2024-07-24 ENCOUNTER — ROUTINE PRENATAL (OUTPATIENT)
Dept: OBSTETRICS AND GYNECOLOGY | Facility: CLINIC | Age: 39
End: 2024-07-24
Payer: COMMERCIAL

## 2024-07-24 VITALS — BODY MASS INDEX: 34.57 KG/M2 | WEIGHT: 177 LBS | SYSTOLIC BLOOD PRESSURE: 103 MMHG | DIASTOLIC BLOOD PRESSURE: 69 MMHG

## 2024-07-24 DIAGNOSIS — O99.891 BACK PAIN IN PREGNANCY: ICD-10-CM

## 2024-07-24 DIAGNOSIS — O12.13 GESTATIONAL PROTEINURIA IN THIRD TRIMESTER: ICD-10-CM

## 2024-07-24 DIAGNOSIS — Z3A.29 29 WEEKS GESTATION OF PREGNANCY: ICD-10-CM

## 2024-07-24 DIAGNOSIS — M54.9 BACK PAIN IN PREGNANCY: ICD-10-CM

## 2024-07-24 DIAGNOSIS — Z34.93 THIRD TRIMESTER PREGNANCY: Primary | ICD-10-CM

## 2024-07-24 LAB
GLUCOSE UR STRIP-MCNC: NEGATIVE MG/DL
LEUKOCYTE EST, POC: ABNORMAL
PROT UR STRIP-MCNC: ABNORMAL MG/DL

## 2024-07-24 NOTE — PROGRESS NOTES
OB VISIT 29 WEEKS      Chief Complaint   Patient presents with    Routine Prenatal Visit         Yuliya is a 39 y.o.  29w6d being seen today for her obstetrical visit.  Patient reports backache and fatigue. Fetal movement:  having  . The patient currently sees Dr. Carroll.      REVIEW OF SYSTEMS  Cramping/contractions: denies  Vaginal bleeding: denies  Fetal movement: present but not sure how to track movements throughout the day    Review of Systems   Eyes:  Negative for blurred vision, double vision and visual disturbance.   Gastrointestinal:  Negative for abdominal pain.   Neurological:  Negative for light-headedness and headache.   All other systems reviewed and are negative.      /69   Wt 80.3 kg (177 lb)   LMP 2023   BMI 34.57 kg/m²     Prenatal Assessment  Fetal Heart Rate: 154  Movement: Present  Presentation: Vertex  Edema  LLE Edema: None  RLE Edema: None  Facial Edema: None    Physical Exam  Constitutional:       General: She is awake.      Appearance: Normal appearance. She is well-developed and well-groomed.   HENT:      Head: Normocephalic and atraumatic.   Pulmonary:      Effort: Pulmonary effort is normal.   Musculoskeletal:      Cervical back: Normal range of motion.   Neurological:      General: No focal deficit present.      Mental Status: She is alert and oriented to person, place, and time.   Skin:     General: Skin is warm and dry.   Psychiatric:         Mood and Affect: Mood normal.         Behavior: Behavior normal. Behavior is cooperative.   Vitals reviewed.         ASSESSMENT/PLAN    Diagnoses and all orders for this visit:    1. Third trimester pregnancy (Primary)  -     POC Urinalysis Dipstick    2. 29 weeks gestation of pregnancy  -     POC Urinalysis Dipstick    3. Gestational proteinuria in third trimester  -     Comprehensive Metabolic Panel  -     Bile Acids, Total  -     CBC (No Diff)  -     CBC (No Diff)  -     Comprehensive Metabolic Panel  -     Protein /  Creatinine Ratio, Urine - Urine, Clean Catch  -     Uric Acid  -     Lactate Dehydrogenase; Future    4. Back pain in pregnancy  -     Ambulatory Referral to Physical Therapy         Urine dip today: positive protein, negative glucose. PEP labs ordered today.   Labs today for proteinuria.   Reviewed fetal kick counts and education provided.  Reviewed this stage of pregnancy.  Problem list updated.   Reviewed common symptoms of the third trimester.    Patient is aware of the location of L&D.      Follow up in 2 weeks with Dr. Carroll.     I spent 15 minutes caring for Yuliya on this date of service. This time includes time spent by me in the following activities: preparing for the visit, reviewing tests, performing a medically appropriate examination and/or evaluation, counseling and educating the patient/family/caregiver, referring and communicating with other health care professionals, documenting information in the medical record, independently interpreting results and communicating that information with the patient/family/caregiver, care coordination, ordering medications, ordering test(s), ordering procedure(s), obtaining a separately obtained history, and reviewing a separately obtained history.    Bettina Srivastava CNM  7/24/2024  12:30 EDT       GROWTH ULTRASOUND PRELIMINARY RESULT   7/24/2024  29w6d    INDICATION: for fetal growth, obesity, AMA  BPD: 7.40 cm  HC: 27.43 cm  AC: 26.20 cm  FL: 6.03 cm  OVERALL: 61.1%  EFW: 1598g, 3lb8oz  KELY: 11.33 cm  PRESENTATION: vertex  PLACENTA: anterior  INTERVAL GROWTH: appropriate, growth appropriate for gestational age.  CONSISTENT WITH DATES: yes   COMPARATIVE DATA: not available for examination.  Bettina Srivastava CNM  7/24/2024  12:30 EDT

## 2024-07-25 LAB
CREAT UR-MCNC: 285.2 MG/DL
ERYTHROCYTE [DISTWIDTH] IN BLOOD BY AUTOMATED COUNT: 13 % (ref 11.7–15.4)
HCT VFR BLD AUTO: 31 % (ref 34–46.6)
HGB BLD-MCNC: 10.2 G/DL (ref 11.1–15.9)
MCH RBC QN AUTO: 28.4 PG (ref 26.6–33)
MCHC RBC AUTO-ENTMCNC: 32.9 G/DL (ref 31.5–35.7)
MCV RBC AUTO: 86 FL (ref 79–97)
PLATELET # BLD AUTO: 255 X10E3/UL (ref 150–450)
PROT UR-MCNC: 42.1 MG/DL
PROT/CREAT UR: 148 MG/G CREAT (ref 0–200)
RBC # BLD AUTO: 3.59 X10E6/UL (ref 3.77–5.28)
WBC # BLD AUTO: 7.3 X10E3/UL (ref 3.4–10.8)

## 2024-07-26 LAB — TREPONEMA PALLIDUM IGG+IGM AB [PRESENCE] IN SERUM OR PLASMA BY IMMUNOASSAY: NON REACTIVE

## 2024-08-06 NOTE — PROGRESS NOTES
OB VISIT 32 WEEKS      Chief Complaint   Patient presents with    Routine Prenatal Visit         Yuliya is a 39 y.o.  31w6d being seen today for her obstetrical visit.  Patient reports vaginal irritation and vaginal discharge. She also complains of lower back pain. Fetal movement: normal. The patient currently sees Dr Carroll.       REVIEW OF SYSTEMS  Cramping/contractions: occasional contractions  Vaginal bleeding: denies  Fetal movement: present  Leaking of fluid: denies    Review of Systems   Eyes:  Negative for blurred vision, double vision and visual disturbance.   Gastrointestinal:  Negative for abdominal pain.   Neurological:  Negative for light-headedness and headache.   All other systems reviewed and are negative.      /73   Wt 81.2 kg (179 lb)   LMP 2023   BMI 34.96 kg/m²     Prenatal Assessment  Fetal Heart Rate: 129  Fundal Height (cm): 31 cm  Movement: Present  Presentation: Vertex  Edema  LLE Edema: Trace  RLE Edema: Trace  Facial Edema: Trace    Physical Exam  Constitutional:       General: She is awake.      Appearance: Normal appearance. She is well-developed and well-groomed.   HENT:      Head: Normocephalic and atraumatic.   Pulmonary:      Effort: Pulmonary effort is normal.   Musculoskeletal:      Cervical back: Normal range of motion.   Neurological:      General: No focal deficit present.      Mental Status: She is alert and oriented to person, place, and time.   Skin:     General: Skin is warm and dry.   Psychiatric:         Mood and Affect: Mood normal.         Behavior: Behavior normal. Behavior is cooperative.   Vitals reviewed.         ASSESSMENT/PLAN    Diagnoses and all orders for this visit:    1. Third trimester pregnancy (Primary)  -     POC Urinalysis Dipstick    2. 31 weeks gestation of pregnancy  -     POC Urinalysis Dipstick      Reviewed when and where to go into the hospital.   Reviewed this stage of pregnancy.  Problem list updated.     Follow up in 2 weeks  with Dr. Carroll.     I spent 30 minutes caring for Yuliya on this date of service. This time includes time spent by me in the following activities: preparing for the visit, reviewing tests, performing a medically appropriate examination and/or evaluation, counseling and educating the patient/family/caregiver, referring and communicating with other health care professionals, documenting information in the medical record, independently interpreting results and communicating that information with the patient/family/caregiver, care coordination, ordering medications, ordering test(s), ordering procedure(s), obtaining a separately obtained history, and reviewing a separately obtained history.    Bettina Srivastava CNM  8/7/2024  13:14 EDT

## 2024-08-07 ENCOUNTER — ROUTINE PRENATAL (OUTPATIENT)
Dept: OBSTETRICS AND GYNECOLOGY | Facility: CLINIC | Age: 39
End: 2024-08-07
Payer: COMMERCIAL

## 2024-08-07 VITALS — BODY MASS INDEX: 34.96 KG/M2 | WEIGHT: 179 LBS | DIASTOLIC BLOOD PRESSURE: 73 MMHG | SYSTOLIC BLOOD PRESSURE: 109 MMHG

## 2024-08-07 DIAGNOSIS — N89.8 VAGINAL DISCHARGE: ICD-10-CM

## 2024-08-07 DIAGNOSIS — Z3A.31 31 WEEKS GESTATION OF PREGNANCY: ICD-10-CM

## 2024-08-07 DIAGNOSIS — N89.8 VAGINAL IRRITATION: ICD-10-CM

## 2024-08-07 DIAGNOSIS — Z34.93 THIRD TRIMESTER PREGNANCY: Primary | ICD-10-CM

## 2024-08-09 LAB
A VAGINAE DNA VAG QL NAA+PROBE: ABNORMAL SCORE
BVAB2 DNA VAG QL NAA+PROBE: ABNORMAL SCORE
C ALBICANS DNA VAG QL NAA+PROBE: POSITIVE
C GLABRATA DNA VAG QL NAA+PROBE: NEGATIVE
C TRACH DNA SPEC QL NAA+PROBE: NEGATIVE
MEGA1 DNA VAG QL NAA+PROBE: ABNORMAL SCORE
N GONORRHOEA DNA VAG QL NAA+PROBE: NEGATIVE
T VAGINALIS DNA VAG QL NAA+PROBE: NEGATIVE

## 2024-08-16 ENCOUNTER — HOSPITAL ENCOUNTER (OUTPATIENT)
Dept: PHYSICAL THERAPY | Facility: HOSPITAL | Age: 39
Discharge: HOME | End: 2024-08-16
Payer: COMMERCIAL

## 2024-08-16 DIAGNOSIS — O26.893 PELVIC PAIN AFFECTING PREGNANCY IN THIRD TRIMESTER, ANTEPARTUM: ICD-10-CM

## 2024-08-16 DIAGNOSIS — R10.2 PELVIC PAIN AFFECTING PREGNANCY IN THIRD TRIMESTER, ANTEPARTUM: ICD-10-CM

## 2024-08-16 DIAGNOSIS — M54.50 PREGNANCY RELATED LOW BACK PAIN IN THIRD TRIMESTER, ANTEPARTUM: Primary | ICD-10-CM

## 2024-08-16 DIAGNOSIS — Z74.09 IMPAIRED MOBILITY: ICD-10-CM

## 2024-08-16 DIAGNOSIS — O26.893 PREGNANCY RELATED LOW BACK PAIN IN THIRD TRIMESTER, ANTEPARTUM: Primary | ICD-10-CM

## 2024-08-16 PROCEDURE — 97162 PT EVAL MOD COMPLEX 30 MIN: CPT

## 2024-08-16 NOTE — THERAPY EVALUATION
Outpatient Physical Therapy Ortho Initial Evaluation  [facility]     Patient Name: Yuliya Gayle  : 1985  MRN: [id number]  Today's Date: 2024      Visit Date: 2024    Patient Active Problem List   Diagnosis    Obesity (BMI 30-39.9)    History of     AMA (advanced maternal age) multigravida 35+    Pregnancy    History of         Past Medical History:   Diagnosis Date    Urinary tract infection     Urogenital trichomoniasis         Past Surgical History:   Procedure Laterality Date    BREAST BIOPSY       SECTION         Visit Dx:     ICD-10-CM ICD-9-CM   1. Pregnancy related low back pain in third trimester, antepartum  O26.893 646.83    M54.50 724.2   2. Pelvic pain affecting pregnancy in third trimester, antepartum  O26.893 646.83    R10.2 625.9   3. Impaired mobility  Z74.09 799.89          Patient History       Row Name 24 1400             Fall Risk Assessment    Any falls in the past year: No  -RS         Daily Activities    Primary Language English  -RS      Are you able to read Yes  -RS      Are you able to write Yes  -RS      How does patient learn best? Listening;Demonstration;Reading  -RS      Pt Participated in POC and Goals Yes  -RS         Safety    Are you being hurt, hit, or frightened by anyone at home or in your life? No  -RS      Are you being neglected by a caregiver No  -RS                User Key  (r) = Recorded By, (t) = Taken By, (c) = Cosigned By      Initials Name Provider Type    RS Natasha Cannon PT Physical Therapist                     PT Ortho       Row Name 24 1400       Posture/Observations    Alignment Options Thoracic kyphosis;Rounded shoulders;Lumbar lordosis  -RS    Thoracic Kyphosis Mild;Increased  -RS    Rounded Shoulders Mild;Increased  -RS    Lumbar lordosis Moderate;Increased  -RS       Neural Tension Signs- Lower Quarter Clearing    SLR Right:;Positive  -RS       Lumbar ROM Screen- Lower Quarter Clearing    Lumbar  Flexion Normal  limited by gravid stomach but reports no increase inp ain  -RS    Lumbar Extension Impaired  inc pain, 50%  -RS    Lumbar Lateral Flexion Impaired  50% B  -RS       General ROM    GENERAL ROM COMMENTS pain with PROM hip flexion and ER B LE  -RS       MMT (Manual Muscle Testing)    Rt Lower Ext Rt Hip Flexion;Rt Hip ABduction;Rt Hip Internal (Medial) Rotation;Rt Hip External (Lateral) Rotation;Rt Knee WNL;Rt Ankle WNL  -RS    Lt Lower Ext Lt Hip Flexion;Lt Hip ABduction;Lt Hip Internal (Medial) Rotation;Lt Hip External (Lateral) Rotation;Lt Knee WNL;Lt Ankle WNL  -RS       MMT Right Lower Ext    Rt Hip Flexion MMT, Gross Movement (4/5) good  -RS    Rt Hip ABduction MMT, Gross Movement (3+/5) fair plus  -RS    Rt Hip Internal (Medial) Rotation MMT, Gross Movement (4-/5) good minus  -RS    Rt Hip External (Lateral) Rotation MMT, Gross Movement (4-/5) good minus  -RS       MMT Left Lower Ext    Lt Hip Flexion MMT, Gross Movement (4/5) good  -RS    Lt Hip ABduction MMT, Gross Movement (3+/5) fair plus  -RS    Lt Hip Internal (Medial) Rotation MMT, Gross Movement (4-/5) good minus  -RS    Lt Hip External (Lateral) Rotation MMT, Gross Movement (4-/5) good minus  -RS       Flexibility    Flexibility Tested? Lower Extremity  -RS       Lower Extremity Flexibility    Hip Flexors Bilateral:;Moderately limited  -RS       Balance Skills Training    SLS dec stability, no significant inc in pain reported  -RS              User Key  (r) = Recorded By, (t) = Taken By, (c) = Cosigned By      Initials Name Provider Type    RS Natasha Cannon PT Physical Therapist                             Pelvic Health       Row Name 08/16/24 1400             Subjective    Brief Description of Chief Complaint The pt is a 38 yo female who presents with pregnancy related low back pain present a couple of months. She is currently 33 weeks and 1 day pregnant. She has pain with walking, sit to stand, sleeping, driving specifically in  the front of her pelvis. She also reports difficulty with urination and feeling as though she needs to push down to start the flow of urine.  -RS      Patient Goals improve the pain  -RS      Patient Participated in POC and Goals Yes  -RS         Fall Risk Assessment    Any falls in the past year: No  -RS         Daily Activities    Primary Language English  -RS      Are you able to read Yes  -RS      Are you able to write Yes  -RS      How does patient learn best? Listening;Demonstration;Reading  -RS      Pt Participated in POC and Goals Yes  -RS         Safety    Are you being hurt, hit, or frightened by anyone at home or in your life? No  -RS      Are you being neglected by a caregiver No  -RS         Urinary/Bowel History    Difficulty starting stream yes  -RS      Stress incontinence no  -RS      Urgency no  -RS         Pregnancy/Sexual History    Number of Pregnancies 5  -RS      Number of Children 2  -RS      How many weeks pregnant are you? 33 weeks  -RS      Type of Previous Deliveries Vaginal;  most recently  4 years ago  -RS      Due Date 10/03/24  -RS      Do you have radicular pain or numbness? No  -RS      Are you currently exercising? No  used to walk a lot  -RS      Pain with initial penetration Yes  -RS      Pain with deep penetration Yes  -RS         Lumbosacral Palpation    Piriformis Bilateral:;Tender  -RS      Erector Spinae (Paraspinals) Bilateral:;Tender;Guarded/taut  -RS      Pubic Symphysis Tender  -RS         Observations    Posture Observations increased time required for transitional positions, increased lateral trunk sway, decreased step length and praneeth  -RS                User Key  (r) = Recorded By, (t) = Taken By, (c) = Cosigned By      Initials Name Provider Type    RS Natasha Cannon PT Physical Therapist                    Therapy Education  Education Details: Role of  PF PT, POC, differential diagnosis, initial HEP, expectations, goals, anatomy, role of  PF.  Given: HEP  Program: New  How Provided: Verbal, Demonstration, Written  Provided to: Patient  Level of Understanding: Verbalized, Demonstrated      PT OP Goals       Row Name 08/16/24 1600          PT Short Term Goals    STG Date to Achieve 09/30/24  -RS     STG 1 The pt will be educated regarding appropriate body mechanics to minimize pain with transitional positions.  -RS     STG 1 Progress New  -RS        Long Term Goals    LTG Date to Achieve 11/14/24  -RS     LTG 1 The pt will report understanding of labor education to facilitate improved labor prep.  -RS     LTG 1 Progress New  -RS     LTG 2 The pt will report ability to sleep 2 hours without waking to urinate or in pain to facilitate improved restorative sleep pattern.  -RS     LTG 2 Progress New  -RS        Time Calculation    PT Goal Re-Cert Due Date 11/14/24  -RS               User Key  (r) = Recorded By, (t) = Taken By, (c) = Cosigned By      Initials Name Provider Type    RS Natasha Cannon, PT Physical Therapist                     PT Assessment/Plan       Row Name 08/16/24 1600          PT Assessment    Functional Limitations Limitation in home management;Limitations in community activities;Performance in self-care ADL;Performance in work activities  -RS     Impairments Impaired muscle length;Impaired muscle power;Impaired muscle endurance;Range of motion;Posture;Peripheral nerve integrity;Pain;Muscle strength  -RS     Assessment Comments Yuliya Gayle is a 39 y.o. female referred to physical therapy for pregnancy related low back pain. She presents with an evolving clinical presentation, along with no remarkable comorbidities and personal factors of severity of pain, pt is currently 33 weeks 1 day pregnant that may impact her progress in the plan of care. Pt presents today with decreased lumbar extension mobility, altered gait pattern, significant pain with palpation of pubic symphysis, decreased PROM B hips, decreased B hip strength . her signs  and symptoms are consistent with referring diagnosis. The previous impairments limit her ability to perform self care ADLs, sleep, navigate community and house, drive, perform transitional positions. The pt self scores 41% disability on the pregnancy mobility index (100=full disability).Pt will benefit from skilled PT to address the previous impairments and return to PLOF.  -RS     Please refer to paper survey for additional self-reported information No  -RS     Rehab Potential Good  -RS     Patient/caregiver participated in establishment of treatment plan and goals Yes  -RS     Patient would benefit from skilled therapy intervention Yes  -RS        PT Plan    PT Frequency 1x/week  -RS     Predicted Duration of Therapy Intervention (PT) 3-4 sessions  -RS     Planned CPT's? PT RE-EVAL: 92595;PT THER PROC EA 15 MIN: 74592;PT THER ACT EA 15 MIN: 79548;PT MANUAL THERAPY EA 15 MIN: 88533;PT NEUROMUSC RE-EDUCATION EA 15 MIN: 78678;PT GAIT TRAINING EA 15 MIN: 01083;PT SELF CARE/HOME MGMT/TRAIN EA 15: 18164;PT HOT OR COLD PACK TREAT MCARE;PT ELECTRICAL STIM UNATTEND: ;PT TRACTION LUMBAR: 84956;PT EVAL LOW COMPLEXITY: 91654  -RS     PT Plan Comments Consider STM B lumbar ES,  consider LTR, hip hike, hip AB iso, review body mechanics, pain management as pt will be 34 weeks along (of note, pt reports giving birth around 36 weeks in the past)  -RS               User Key  (r) = Recorded By, (t) = Taken By, (c) = Cosigned By      Initials Name Provider Type    RS Natasha Cannon, PT Physical Therapist                       OP Exercises       Row Name 08/16/24 1600 08/16/24 1400          Subjective    Brief Description of Chief Complaint -- The pt is a 40 yo female who presents with pregnancy related low back pain present a couple of months. She is currently 33 weeks and 1 day pregnant. She has pain with walking, sit to stand, sleeping, driving specifically in the front of her pelvis. She also reports difficulty with  urination and feeling as though she needs to push down to start the flow of urine.  -RS     Patient Goals -- improve the pain  -RS     Patient Participated in POC and Goals -- Yes  -RS        Exercise 1    Exercise Name 1 body mechanics review- log roll, car  -RS --        Exercise 2    Exercise Name 2 hip AD  -RS --     Cueing 2 Verbal;Demo  -RS --     Reps 2 10  -RS --     Time 2 10  -RS --     Additional Comments ball  -RS --        Exercise 3    Exercise Name 3 seated ball roll out  -RS --     Cueing 3 Verbal;Demo  -RS --     Reps 3 10  -RS --     Time 3 5  -RS --        Exercise 4    Exercise Name 4 encouraged to  wear belly band when standing and walking  -RS --               User Key  (r) = Recorded By, (t) = Taken By, (c) = Cosigned By      Initials Name Provider Type    RS Natasha Cannon, PT Physical Therapist                                            Time Calculation:     Start Time: 1400  Stop Time: 1440  Time Calculation (min): 40 min  Untimed Charges  PT Eval/Re-eval Minutes: 39  Total Minutes  Untimed Charges Total Minutes: 39   Total Minutes: 39     Therapy Charges for Today       Code Description Service Date Service Provider Modifiers Qty    80068431818 HC PT EVAL MOD COMPLEXITY 3 8/16/2024 Natasha Cannon, PT GP 1                      Natasha Cannon PT  8/16/2024

## 2024-08-21 ENCOUNTER — ROUTINE PRENATAL (OUTPATIENT)
Dept: OBSTETRICS AND GYNECOLOGY | Facility: CLINIC | Age: 39
End: 2024-08-21
Payer: COMMERCIAL

## 2024-08-21 VITALS — DIASTOLIC BLOOD PRESSURE: 71 MMHG | SYSTOLIC BLOOD PRESSURE: 111 MMHG | WEIGHT: 179.4 LBS | BODY MASS INDEX: 35.04 KG/M2

## 2024-08-21 DIAGNOSIS — O99.210 OBESITY IN PREGNANCY, ANTEPARTUM: ICD-10-CM

## 2024-08-21 DIAGNOSIS — R10.9 ABDOMINAL PAIN AFFECTING PREGNANCY: ICD-10-CM

## 2024-08-21 DIAGNOSIS — O26.893 PELVIC PAIN IN PREGNANCY, ANTEPARTUM, THIRD TRIMESTER: ICD-10-CM

## 2024-08-21 DIAGNOSIS — Z3A.33 33 WEEKS GESTATION OF PREGNANCY: Primary | ICD-10-CM

## 2024-08-21 DIAGNOSIS — Z98.891 HISTORY OF C-SECTION: ICD-10-CM

## 2024-08-21 DIAGNOSIS — O26.899 ABDOMINAL PAIN AFFECTING PREGNANCY: ICD-10-CM

## 2024-08-21 DIAGNOSIS — Z34.93 PRENATAL CARE IN THIRD TRIMESTER: ICD-10-CM

## 2024-08-21 DIAGNOSIS — O09.523 MULTIGRAVIDA OF ADVANCED MATERNAL AGE IN THIRD TRIMESTER: ICD-10-CM

## 2024-08-21 DIAGNOSIS — R10.2 PELVIC PAIN IN PREGNANCY, ANTEPARTUM, THIRD TRIMESTER: ICD-10-CM

## 2024-08-21 DIAGNOSIS — Z98.891 HISTORY OF VBAC: ICD-10-CM

## 2024-08-21 PROCEDURE — 99213 OFFICE O/P EST LOW 20 MIN: CPT | Performed by: STUDENT IN AN ORGANIZED HEALTH CARE EDUCATION/TRAINING PROGRAM

## 2024-08-21 NOTE — PROGRESS NOTES
Chief Complaint   Patient presents with    Routine Prenatal Visit     Pt states having lower abd pain      Yuliya Gayle is a 39 y.o.  at 33w6d who presents for routine prenatal visit. She reports doing having mild off and on abdominal pain and pelvic discomfort. Denies vaginal bleeding, contractions, LOF. She reports active fetal movement.     /71   Wt 81.4 kg (179 lb 6.4 oz)   LMP 2023   BMI 35.04 kg/m²    Gen: well appearing, NAD   Abd: gravid, nontender  See OB Flowsheet    ASSESSMENT:   IUP at 33w6d   Prenatal care in third trimester   AMA   History of C/s x 1  Obesity- BMI 33  History of    Abdominal and pelvic pain in pregnancy     PLAN:  Problem list reviewed and updated.   Reviewed expectations of this stage of pregnancy.   Third trimester precautions reviewed including labor signs, monitoring fetal movements.   Will continue serial growths scans every 4 weeks until delivery and plan for  testing at 34-36 weeks with weekly BPPs until delivery.   Will refer to pelvic floor PT for to help with musculoskeletal pain of pregnancy.   Return in about 1 week (around 2024) for BPP, Prenatal visit, growth ultrasound.    Patient Active Problem List    Diagnosis Date Noted    Obesity (BMI 30-39.9) 2024    History of  2024    AMA (advanced maternal age) multigravida 35+ 2024    Pregnancy 2024    History of  2024       No orders of the defined types were placed in this encounter.    Chari Carroll MD

## 2024-08-28 ENCOUNTER — ROUTINE PRENATAL (OUTPATIENT)
Dept: OBSTETRICS AND GYNECOLOGY | Facility: CLINIC | Age: 39
End: 2024-08-28
Payer: COMMERCIAL

## 2024-08-28 VITALS — WEIGHT: 180.4 LBS | DIASTOLIC BLOOD PRESSURE: 72 MMHG | BODY MASS INDEX: 35.23 KG/M2 | SYSTOLIC BLOOD PRESSURE: 111 MMHG

## 2024-08-28 DIAGNOSIS — Z98.891 HISTORY OF C-SECTION: ICD-10-CM

## 2024-08-28 DIAGNOSIS — O09.523 MULTIGRAVIDA OF ADVANCED MATERNAL AGE IN THIRD TRIMESTER: ICD-10-CM

## 2024-08-28 DIAGNOSIS — Z34.93 PRENATAL CARE IN THIRD TRIMESTER: ICD-10-CM

## 2024-08-28 DIAGNOSIS — O99.210 OBESITY IN PREGNANCY, ANTEPARTUM: ICD-10-CM

## 2024-08-28 DIAGNOSIS — Z98.891 HISTORY OF VBAC: ICD-10-CM

## 2024-08-28 DIAGNOSIS — Z3A.36 36 WEEKS GESTATION OF PREGNANCY: Primary | ICD-10-CM

## 2024-08-28 RX ORDER — NYSTATIN AND TRIAMCINOLONE ACETONIDE 100000; 1 [USP'U]/G; MG/G
1 OINTMENT TOPICAL 2 TIMES DAILY
Qty: 30 G | Refills: 0 | Status: SHIPPED | OUTPATIENT
Start: 2024-08-28 | End: 2024-09-04

## 2024-08-28 NOTE — PROGRESS NOTES
Chief Complaint   Patient presents with    Routine Prenatal Visit      Yuliya Gayle is a 39 y.o.  at 34w6d who presents for routine prenatal visit. She reports having irregular contractions that are sometimes painful. Denies vaginal bleeding or LOF. She reports active fetal movement.     /72   Wt 81.8 kg (180 lb 6.4 oz)   LMP 2023   BMI 35.23 kg/m²    Gen: well appearing, NAD   Abd: gravid, nontender  SVE:1.5/50/-3  See OB Flowsheet    ASSESSMENT:   IUP at 34w6d   Prenatal care in third trimester   AMA   History of C/s x 1  Obesity- BMI 33  History of      PLAN:  Problem list reviewed and updated.   Reviewed expectations of this stage of pregnancy.   Third trimester precautions reviewed including labor signs, monitoring fetal movements.   Collected GBS swab today given history of  delivery.   Discussed ultrasound results that demonstrated normal fetal growth with EFW 2529 g (45%ile, AC 59%ile), normal KELY 10.4 cm,  bpm, cephalic presentation, anterior placenta, BPP 88.   Return in about 1 week (around 2024) for Prenatal visit, BPP.    Patient Active Problem List    Diagnosis Date Noted    Obesity (BMI 30-39.9) 2024    History of  2024    AMA (advanced maternal age) multigravida 35+ 2024    Pregnancy 2024    History of  2024       Orders Placed This Encounter   Procedures    Strep Grp B SONY + Reflex - Swab, Vaginal/Rectum     Order Specific Question:   Release to patient     Answer:   Routine Release [[id number]]    Strep Gp B Susceptibility - ,     Chari Carroll MD

## 2024-08-29 ENCOUNTER — TELEPHONE (OUTPATIENT)
Dept: OBSTETRICS AND GYNECOLOGY | Facility: CLINIC | Age: 39
End: 2024-08-29
Payer: COMMERCIAL

## 2024-08-29 NOTE — TELEPHONE ENCOUNTER
Caller: Yuliya Gayle    Relationship to patient: Self    Best call back number: [phone] CALL ANYTIME.     Chief complaint: OB PATIENT(35 WEEKS) CALLED REQUESTING TO SPEAK WITH CLINICAL.PATIENT STATED HAS BEEN IN A LOT OF PAIN SINCE YESTERDAY. PAIN FEELS LIKE CONTRACTIONS AND IS GETTING WORSE. PATIENT STATED PAIN COMES AND GOES EVERY COUPLE MINUTES.     Patient directed to call 911 or go to their nearest emergency room.     Patient verbalized understanding: [x] Yes  [] No  If no, why?    Additional notes: HUB UNABLE TO WARM TRANSFER DUE TO OFFICE DOES NOT OPEN UNTIL 8:00 AM.

## 2024-08-29 NOTE — TELEPHONE ENCOUNTER
[facility] pt c/o contractions and pain getting worse. She states it comes and goes every couple of minutes.

## 2024-08-29 NOTE — TELEPHONE ENCOUNTER
Spoke with Yuliya. States her contractions are slowing down. I instructed her to drink lots of water today, at least 8 to 10 glasses. I told her dehydration can cause contractions and hydration can help them to go away, since she is early at 35 wks. I told her if the contractions do not stop, she will need to go to [facility] L[facility]D to be seen. She agreed.

## 2024-09-03 ENCOUNTER — PATIENT OUTREACH (OUTPATIENT)
Dept: LABOR AND DELIVERY | Facility: HOSPITAL | Age: 39
End: 2024-09-03
Payer: COMMERCIAL

## 2024-09-03 LAB
CLINDAMYCIN ISLT KB: ABNORMAL
GP B STREP DNA SPEC QL NAA+PROBE: POSITIVE
ORGANISM ID: ABNORMAL

## 2024-09-03 NOTE — OUTREACH NOTE
[facility]  Check-In    Current Estimated Gestational Age: 35w5d      Questions/Answers      Flowsheet Row Responses   Best Method for Contacting Cell   Demographics Reviewed Yes   Able to keep appointments as scheduled Yes   Gender(s) and Name(s) girl   Baby Active/Feeling Fetal Movemen Yes   How are you presently feeling? good   Supplies ready for baby Car Seat, Clothing, Crib, Diapers, Feeding Supplies   Resource/Environmental Concerns None   Do you have any questions related to your care experience, your pregnancy, plans for delivery, any concerns, etc? No   Other Education [facility] Benefits, Insurance benefits/Incentives          Pt doing well, starting to have some infrequent contractions. Reviewed labor precautions and location of L&D. She states that she has not felt as much fetal movement this pregnancy due to anterior placenta and this has caused some anxiety but she feels reassured after talking with provider about this. She has all necessary infant supplies, just needs to her breast pump while IP. F/u inpatient after delivery.     Bea Rodrigues RN  Maternity Nurse Navigator    9/3/2024, 18:14 EDT

## 2024-09-04 ENCOUNTER — TELEPHONE (OUTPATIENT)
Dept: OBSTETRICS AND GYNECOLOGY | Facility: CLINIC | Age: 39
End: 2024-09-04

## 2024-09-04 ENCOUNTER — HOSPITAL ENCOUNTER (EMERGENCY)
Facility: HOSPITAL | Age: 39
Discharge: HOME | End: 2024-09-04
Attending: STUDENT IN AN ORGANIZED HEALTH CARE EDUCATION/TRAINING PROGRAM | Admitting: OBSTETRICS & GYNECOLOGY
Payer: COMMERCIAL

## 2024-09-04 ENCOUNTER — HOSPITAL ENCOUNTER (OUTPATIENT)
Dept: PHYSICAL THERAPY | Facility: HOSPITAL | Age: 39
Setting detail: THERAPIES SERIES
Discharge: HOME | End: 2024-09-04
Payer: COMMERCIAL

## 2024-09-04 VITALS
DIASTOLIC BLOOD PRESSURE: 76 MMHG | HEIGHT: 60 IN | BODY MASS INDEX: 35.23 KG/M2 | HEART RATE: 84 BPM | TEMPERATURE: 98.4 F | OXYGEN SATURATION: 100 % | SYSTOLIC BLOOD PRESSURE: 116 MMHG

## 2024-09-04 DIAGNOSIS — O26.893 PELVIC PAIN AFFECTING PREGNANCY IN THIRD TRIMESTER, ANTEPARTUM: ICD-10-CM

## 2024-09-04 DIAGNOSIS — O26.893 PREGNANCY RELATED LOW BACK PAIN IN THIRD TRIMESTER, ANTEPARTUM: Primary | ICD-10-CM

## 2024-09-04 DIAGNOSIS — M54.50 PREGNANCY RELATED LOW BACK PAIN IN THIRD TRIMESTER, ANTEPARTUM: Primary | ICD-10-CM

## 2024-09-04 DIAGNOSIS — Z74.09 IMPAIRED MOBILITY: ICD-10-CM

## 2024-09-04 DIAGNOSIS — R10.2 PELVIC PAIN AFFECTING PREGNANCY IN THIRD TRIMESTER, ANTEPARTUM: ICD-10-CM

## 2024-09-04 LAB
BACTERIA UR QL AUTO: ABNORMAL /HPF
BILIRUB UR QL STRIP: NEGATIVE
CLARITY UR: CLEAR
COLOR UR: YELLOW
GLUCOSE UR STRIP-MCNC: NEGATIVE MG/DL
HGB UR QL STRIP.AUTO: NEGATIVE
HYALINE CASTS UR QL AUTO: ABNORMAL /LPF
KETONES UR QL STRIP: NEGATIVE
LEUKOCYTE ESTERASE UR QL STRIP.AUTO: ABNORMAL
NITRITE UR QL STRIP: NEGATIVE
PH UR STRIP.AUTO: 6.5 [PH] (ref 5–8)
PROT UR QL STRIP: NEGATIVE
RBC # UR STRIP: ABNORMAL /HPF
REF LAB TEST METHOD: ABNORMAL
SP GR UR STRIP: <=1.005 (ref 1–1.03)
SQUAMOUS #/AREA URNS HPF: ABNORMAL /HPF
UROBILINOGEN UR QL STRIP: ABNORMAL
WBC # UR STRIP: ABNORMAL /HPF

## 2024-09-04 PROCEDURE — 87086 URINE CULTURE/COLONY COUNT: CPT | Performed by: OBSTETRICS & GYNECOLOGY

## 2024-09-04 PROCEDURE — 99284 EMERGENCY DEPT VISIT MOD MDM: CPT | Performed by: OBSTETRICS & GYNECOLOGY

## 2024-09-04 PROCEDURE — 97530 THERAPEUTIC ACTIVITIES: CPT

## 2024-09-04 PROCEDURE — 59025 FETAL NON-STRESS TEST: CPT

## 2024-09-04 PROCEDURE — 25810000003 LACTATED RINGERS SOLUTION: Performed by: OBSTETRICS & GYNECOLOGY

## 2024-09-04 PROCEDURE — 81001 URINALYSIS AUTO W/SCOPE: CPT | Performed by: OBSTETRICS & GYNECOLOGY

## 2024-09-04 RX ORDER — SODIUM CHLORIDE 0.9 % (FLUSH) 0.9 %
10 SYRINGE (ML) INJECTION AS NEEDED
Status: DISCONTINUED | OUTPATIENT
Start: 2024-09-04 | End: 2024-09-04 | Stop reason: HOSPADM

## 2024-09-04 RX ORDER — SODIUM CHLORIDE 0.9 % (FLUSH) 0.9 %
10 SYRINGE (ML) INJECTION EVERY 12 HOURS SCHEDULED
Status: DISCONTINUED | OUTPATIENT
Start: 2024-09-04 | End: 2024-09-04 | Stop reason: HOSPADM

## 2024-09-04 RX ADMIN — SODIUM CHLORIDE, POTASSIUM CHLORIDE, SODIUM LACTATE AND CALCIUM CHLORIDE 1000 ML: 600; 310; 30; 20 INJECTION, SOLUTION INTRAVENOUS at 10:48

## 2024-09-04 NOTE — THERAPY TREATMENT NOTE
Outpatient Physical Therapy Pelvic Health Treatment Note  [facility]     Patient Name: Yuliya Gayle  : 1985  MRN: [id number]  Today's Date: 2024        Visit Date: 2024    Visit Dx:    ICD-10-CM ICD-9-CM   1. Pregnancy related low back pain in third trimester, antepartum  O26.893 646.83    M54.50 724.2   2. Pelvic pain affecting pregnancy in third trimester, antepartum  O26.893 646.83    R10.2 625.9   3. Impaired mobility  Z74.09 799.89       Patient Active Problem List   Diagnosis    Obesity (BMI 30-39.9)    History of     AMA (advanced maternal age) multigravida 35+    Pregnancy    History of                           PT Assessment/Plan       Row Name 24 0900          PT Assessment    Assessment Comments Pt presents for first f/u today with late arrival for appt. Pt states she feels she may be in pre-term labor. We discussed her hx of early delivery with 2 of her previous pregnancies, which she reports were around 36w5d. Today pt is 35w6d GA. She denies abdominal/low back cramping/conctractions initially, but reports sharp pain in front of pelvis. States to prefers to perform exercises in seated position due to pain. Attempted 2 gentle seated isometric/mobiltiy exercises, but held due to worsening pain. Pt then began reporting lower abdominal cramping/contractions. I recommended we hold on rest of appointment and she present upstairs to L&D, but pt unsure if she should do this vs. attend her 36w OB appt in 1 hour. I encouraged pt to call OB office since she was unsure, and they also recommended she go upstairs to L&D. Provided pt with w/c transport to L&D via PT tech.  -CC        PT Plan    PT Plan Comments Likely hold on PT until appropriate postpartum? F/u on results of visit to L&D today.  -CC               User Key  (r) = Recorded By, (t) = Taken By, (c) = Cosigned By      Initials Name Provider Type    CC Tracy Kaba, PT Physical Therapist               "         OP Exercises       Row Name 24 0900             Subjective    Subjective Comments Pt arrives late for appt today. Reports front pelvic pain worsening. Has concerns she may be close to  labor, but is unsure. Is seeing her OB right after this. Denies abdominal cramping/contractions/low back pain. Reports all pain is \"stabbing\" in front of pelvis. Later into appt reports lower abdominal cramping. Tearful at times throughout.  -CC         Subjective Pain    Subjective Pain Comment Pt arrives at 9:32 for 9:15 appt.  -CC         Total Minutes    06117 - PT Therapeutic Activity Minutes 19  -CC         Exercise 1    Exercise Name 1 education on making decision to go to L&D vs follow up with OB in 1 hour at scheduled appt, POC  -CC         Exercise 2    Exercise Name 2 hip AD  -CC      Cueing 2 Verbal;Demo  -CC      Reps 2 10  -CC      Time 2 10  -CC      Additional Comments ball  -CC         Exercise 3    Exercise Name 3 seated ball roll out  -CC      Cueing 3 Verbal;Demo  -CC      Sets 3 3 way  -CC      Reps 3 10 ea way  -CC      Time 3 5  -CC                User Key  (r) = Recorded By, (t) = Taken By, (c) = Cosigned By      Initials Name Provider Type    Tracy Best, PT Physical Therapist                                 PT OP Goals       Row Name 24 0900          PT Short Term Goals    STG Date to Achieve 24  -CC     STG 1 The pt will be educated regarding appropriate body mechanics to minimize pain with transitional positions.  -CC     STG 1 Progress Ongoing  -CC        Long Term Goals    LTG Date to Achieve 24  -CC     LTG 1 The pt will report understanding of labor education to facilitate improved labor prep.  -CC     LTG 1 Progress Ongoing  -CC     LTG 2 The pt will report ability to sleep 2 hours without waking to urinate or in pain to facilitate improved restorative sleep pattern.  -CC     LTG 2 Progress Ongoing  -CC               User Key  (r) = Recorded By, (t) = " Taken By, (c) = Cosigned By      Initials Name Provider Type    CC Tracy Kaba, PT Physical Therapist                                    Time Calculation:   Start Time: 0933 (due to pt arrival)  Stop Time: 0952  Time Calculation (min): 19 min  Total Timed Code Minutes- PT: 19 minute(s)  Timed Charges  72050 - PT Therapeutic Activity Minutes: 19  Total Minutes  Timed Charges Total Minutes: 19   Total Minutes: 19  Therapy Charges for Today       Code Description Service Date Service Provider Modifiers Qty    03206910119  PT THERAPEUTIC ACT EA 15 MIN 9/4/2024 Tracy Kaba, PT GP 1                      Tracy Kaba, PT  9/4/2024

## 2024-09-04 NOTE — TELEPHONE ENCOUNTER
Hub staff attempted to follow warm transfer process and was unsuccessful     Caller: Yuliya Gayle    Relationship to patient: Self    Best call back number: [phone] CALL ANYTIME, IT IS OKAY TO LVM.    Patient is needing: OB PATIENT IS REQUESTING TO SPEAK WITH CLINICAL TO DISCUSS EMERGENCY ROOM VISIT FOR CONTRACTIONS. DO NOTES IN CHART. NO DISCHARGE NOTES AT TIME OF CALL. NEXT OB FOLLOW UP IS 09/11/24. PATIENT WANTS TO KNOW IF SOONER APPT IS NEEDED.

## 2024-09-04 NOTE — TELEPHONE ENCOUNTER
Pt called stating she was in a lot of pain and wanted to leave her physical therapy apt and go to labor and delivery. She has an apt with  and ultra sound today. I spoke with our nurse and advised pt to come to her apt. Pt stated she was was at L&D already.

## 2024-09-04 NOTE — OBED NOTES
DO Note OB    Patient Name: Yuliya Gayle  YOB: 1985  MRN: [id number]  Admission Date: 2024  9:55 AM  Date of Service: 2024    Chief Complaint: Contractions        Subjective     Yuliya Gayle is a 39 y.o. female  at 35w6d with Estimated Date of Delivery: 10/3/24 who presents with the chief complaint listed above.  Patient reports that contractions started last night and are still intermittent in nature, but is ranking them a 10 out of 10 when she does have them.  Patient has a history of delivering at 36+ weeks with both prior pregnancies.  Patient had a  with her first back in  and followed with a .  Patient desires another .  Patient reports after IV fluid bolus feeling much better contractions have spaced out and are mild, patient desires to go home.     She sees Gianna Jennings MD for her prenatal care. Her pregnancy has been complicated by: AMA, history of  x 1, obesity, anemia, group B beta strep positive, history of UTI    She describes fetal movement as normal.  She denies rupture of membranes.  She denies vaginal bleeding. She is feeling contractions.        Objective   Patient Active Problem List    Diagnosis     Obesity (BMI 30-39.9) [E66.9]     History of  [Z98.891]     AMA (advanced maternal age) multigravida 35+ [O09.529]     Pregnancy [Z34.90]     History of  [Z98.891]         OB History    Para Term  AB Living   5 2 2 0 2 2   SAB IAB Ectopic Molar Multiple Live Births   1 0 1 0 0 2      # Outcome Date GA Lbr John/2nd Weight Sex Type Anes PTL Lv   5 Current            4 SAB 2022     SAB      3 Term 10/19/19        JUANPABLO   2 Ectopic 2019           1 Term 14 37w6d    CS-Unspec   JUANPABLO      Complications: Fetal Intolerance, Failure to Progress in First Stage        Past Medical History:   Diagnosis Date    Urinary tract infection     Urogenital trichomoniasis        Past Surgical History:   Procedure  Laterality Date    BREAST BIOPSY       SECTION         No current facility-administered medications on file prior to encounter.     Current Outpatient Medications on File Prior to Encounter   Medication Sig Dispense Refill    nystatin-triamcinolone (MYCOLOG) 794652-6.1 UNIT/GM-% ointment Apply 1 Application topically to the appropriate area as directed 2 (Two) Times a Day for 7 days. 30 g 0    Prenatal Vit-Fe Fumarate-FA (Prenatal/Folic Acid) tablet Take 1 tablet by mouth Daily. 30 each 10    terconazole (TERAZOL 7) 0.4 % vaginal cream Insert 1 applicator into the vagina Every Night. 45 g 1       No Known Allergies    Family History   Problem Relation Age of Onset    No Known Problems Mother     Hypertension Father     No Known Problems Sister     No Known Problems Brother     No Known Problems Daughter     No Known Problems Son     No Known Problems Maternal Aunt     No Known Problems Maternal Uncle     No Known Problems Paternal Aunt     No Known Problems Paternal Uncle     No Known Problems Maternal Grandmother     No Known Problems Maternal Grandfather     No Known Problems Paternal Grandmother     No Known Problems Paternal Grandfather        Social History     Socioeconomic History    Marital status:      Spouse name: mary duong    Number of children: 2    Highest education level: Some college, no degree   Tobacco Use    Smoking status: Never    Smokeless tobacco: Never   Vaping Use    Vaping status: Never Used   Substance and Sexual Activity    Alcohol use: Never    Drug use: Never    Sexual activity: Yes     Partners: Male           Review of Systems   Constitutional:  Negative for chills and fever.   HENT: Negative.     Eyes:  Negative for photophobia and visual disturbance.   Respiratory:  Negative for shortness of breath.    Cardiovascular:  Negative for chest pain.   Gastrointestinal:  Positive for abdominal pain. Negative for nausea.   Psychiatric/Behavioral:  The patient is not  "nervous/anxious.           PHYSICAL EXAM:      VITAL SIGNS:  There were no vitals filed for this visit.         FHT'S:    150s        PHYSICAL EXAM:      General: well developed; well nourished  no acute distress  mentation appropriate   Heart: Not performed.   Lungs   breathing is unlabored   Abdomen: Gravid and non tender     Extremities: trace edema, DTRs 1 plus, no clonus       Cervix: Per RN        Contractions:   irregular                    LABS AND TESTING ORDERED:  Uterine and fetal monitoring  Urinalysis  Serial cervical exams    LAB RESULTS:    No results found for this or any previous visit (from the past 24 hour(s)).    No results found for: \"ABO\", \"RH\"    Lab Results   Component Value Date    STREPGPB Positive (A) 08/28/2024                 External Prenatal Results       Pregnancy Outside Results - Transcribed From Office Records - See Scanned Records For Details       Test Value Date Time    ABO       Rh       Antibody Screen       Varicella IgG       Rubella       Hgb  10.2 g/dL 07/24/24 1211       11.6 g/dL 05/13/24 1006      ^ 11.5 g/dL 03/04/24 0612       12.6 g/dL 01/11/24 1217    Hct  31.0 % 07/24/24 1211       35.0 % 05/13/24 1006      ^ 34.6 % 03/04/24 0612       38.6 % 01/11/24 1217    HgB A1c   6.1 % 01/11/24 1217    1h GTT  82 mg/dL 06/26/24 1214    3h GTT Fasting       3h GTT 1 hour       3h GTT 2 hour       3h GTT 3 hour        Gonorrhea (discrete)  Negative  08/07/24 1317       Negative  02/29/24 1014       Negative  02/08/24 1333       Negative  01/05/24 1256       Negative  12/28/23 1206    Chlamydia (discrete)  Negative  08/07/24 1317       Negative  02/29/24 1014       Negative  02/08/24 1333       Negative  01/05/24 1256       Negative  12/28/23 1206    RPR       Syphils cascade: TP-Ab (FTA)  Non Reactive  07/24/24 1211    TP-Ab       TP-Ab (EIA)       TPPA       HBsAg       Herpes Simplex Virus PCR       Herpes Simplex VIrus Culture       HIV       Hep C RNA Quant PCR       Hep C " Antibody       AFP       NIPT       Cystic Fibroisis        Group B Strep  Positive  24 1635    GBS Susceptibility to Clindamycin       GBS Susceptibility to Erythromycin       Fetal Fibronectin       Genetic Testing, Maternal Blood                 Drug Screening       Test Value Date Time    Urine Drug Screen       Amphetamine Screen  Negative ng/mL 24 1014    Barbiturate Screen  Negative ng/mL 24 1014    Benzodiazepine Screen  Negative ng/mL 24 1014    Methadone Screen  Negative ng/mL 24 1014    Phencyclidine Screen  Negative ng/mL 24 1014    Opiates Screen       THC Screen       Cocaine Screen       Propoxyphene Screen  Negative ng/mL 24 1014    Buprenorphine Screen       Methamphetamine Screen       Oxycodone Screen       Tricyclic Antidepressants Screen                 Legend    ^: Historical                              Impression:   @ 35w6d .  Final Diagnosis: Rule out active labor, [place]    Plan:  1.  Discharge patient  2. Plan of care has been reviewed with patient along with risks, benefits of treatment.   All questions have been answered. Call health care provider for any further concerns and keep office appointments.  3.  Comfort measures, labor precautions      I discussed the patients findings and my recommendations with patient and nursing staff      Gianna Jennings MD  2024  10:14 EDT

## 2024-09-04 NOTE — TELEPHONE ENCOUNTER
Spoke with Yuliya and scheduled for Dr Carroll OB 9/5/24 at 11:45 am. Address for [facility] given.   [address]

## 2024-09-05 ENCOUNTER — ROUTINE PRENATAL (OUTPATIENT)
Dept: OBSTETRICS AND GYNECOLOGY | Facility: CLINIC | Age: 39
End: 2024-09-05
Payer: COMMERCIAL

## 2024-09-05 VITALS — BODY MASS INDEX: 35.04 KG/M2 | SYSTOLIC BLOOD PRESSURE: 103 MMHG | DIASTOLIC BLOOD PRESSURE: 67 MMHG | WEIGHT: 179.4 LBS

## 2024-09-05 DIAGNOSIS — O99.210 OBESITY IN PREGNANCY, ANTEPARTUM: ICD-10-CM

## 2024-09-05 DIAGNOSIS — Z3A.36 36 WEEKS GESTATION OF PREGNANCY: Primary | ICD-10-CM

## 2024-09-05 DIAGNOSIS — Z98.891 HISTORY OF C-SECTION: ICD-10-CM

## 2024-09-05 DIAGNOSIS — Z34.93 PRENATAL CARE IN THIRD TRIMESTER: ICD-10-CM

## 2024-09-05 DIAGNOSIS — B95.1 POSITIVE GBS TEST: ICD-10-CM

## 2024-09-05 DIAGNOSIS — O09.523 MULTIGRAVIDA OF ADVANCED MATERNAL AGE IN THIRD TRIMESTER: ICD-10-CM

## 2024-09-05 DIAGNOSIS — Z98.891 HISTORY OF VBAC: ICD-10-CM

## 2024-09-05 RX ORDER — NYSTATIN AND TRIAMCINOLONE ACETONIDE 100000; 1 [USP'U]/G; MG/G
OINTMENT TOPICAL
COMMUNITY
Start: 2024-08-29

## 2024-09-05 NOTE — PROGRESS NOTES
Chief Complaint   Patient presents with    Routine Prenatal Visit      Yuliya Gayle is a 39 y.o.  at 36w0d who presents for routine prenatal visit. She reports that she has been having off and on contractions. Denies vaginal bleeding or LOF. She reports active fetal movement.     /67   Wt 81.4 kg (179 lb 6.4 oz)   LMP 2023   BMI 35.04 kg/m²    Gen: well appearing, NAD   Abd: gravid, nontender  SVE: 2-3/50/-3  See OB Flowsheet    ASSESSMENT:   IUP at 36w0d   Prenatal care in third trimester   AMA   History of C/s x 1, desires    Obesity- BMI 33  History of    Positive GBS swab     PLAN:  Problem list reviewed and updated.   Reviewed expectations of this stage of pregnancy.   Third trimester precautions reviewed including labor signs, monitoring fetal movements.   Reviewed that the patient will need antibiotics during labor given positive GBS swab.   Discussed ultrasound results that demonstrated BPP 8/8, normal KELY 11.9 cm, anterior placenta, cephalic presentation,  bpm. Will continue serial growth ultrasounds every 4 weeks and weekly  testing with BPPs until delivery. Recommended delivery at 39 weeks.   Follow up in 1 week for BPP and prenatal care visit.     Patient Active Problem List    Diagnosis Date Noted    Obesity (BMI 30-39.9) 2024    History of  2024    AMA (advanced maternal age) multigravida 35+ 2024    Pregnancy 2024    History of  2024       No orders of the defined types were placed in this encounter.    Chari Carroll MD

## 2024-09-06 LAB — BACTERIA SPEC AEROBE CULT: ABNORMAL

## 2024-09-11 ENCOUNTER — ROUTINE PRENATAL (OUTPATIENT)
Dept: OBSTETRICS AND GYNECOLOGY | Facility: CLINIC | Age: 39
End: 2024-09-11
Payer: COMMERCIAL

## 2024-09-11 VITALS — DIASTOLIC BLOOD PRESSURE: 78 MMHG | WEIGHT: 181 LBS | BODY MASS INDEX: 35.35 KG/M2 | SYSTOLIC BLOOD PRESSURE: 111 MMHG

## 2024-09-11 DIAGNOSIS — Z34.93 PRENATAL CARE IN THIRD TRIMESTER: ICD-10-CM

## 2024-09-11 DIAGNOSIS — Z98.891 HISTORY OF C-SECTION: ICD-10-CM

## 2024-09-11 DIAGNOSIS — Z3A.36 36 WEEKS GESTATION OF PREGNANCY: Primary | ICD-10-CM

## 2024-09-11 DIAGNOSIS — O99.210 OBESITY IN PREGNANCY, ANTEPARTUM: ICD-10-CM

## 2024-09-11 DIAGNOSIS — O09.523 MULTIGRAVIDA OF ADVANCED MATERNAL AGE IN THIRD TRIMESTER: ICD-10-CM

## 2024-09-11 DIAGNOSIS — B95.1 POSITIVE GBS TEST: ICD-10-CM

## 2024-09-11 DIAGNOSIS — Z98.891 HISTORY OF VBAC: ICD-10-CM

## 2024-09-13 ENCOUNTER — HOSPITAL ENCOUNTER (OUTPATIENT)
Dept: PHYSICAL THERAPY | Facility: HOSPITAL | Age: 39
Setting detail: THERAPIES SERIES
Discharge: HOME | End: 2024-09-13
Payer: COMMERCIAL

## 2024-09-13 DIAGNOSIS — Z74.09 IMPAIRED MOBILITY: ICD-10-CM

## 2024-09-13 DIAGNOSIS — O26.893 PELVIC PAIN AFFECTING PREGNANCY IN THIRD TRIMESTER, ANTEPARTUM: ICD-10-CM

## 2024-09-13 DIAGNOSIS — R10.2 PELVIC PAIN AFFECTING PREGNANCY IN THIRD TRIMESTER, ANTEPARTUM: ICD-10-CM

## 2024-09-13 DIAGNOSIS — M54.50 PREGNANCY RELATED LOW BACK PAIN IN THIRD TRIMESTER, ANTEPARTUM: Primary | ICD-10-CM

## 2024-09-13 DIAGNOSIS — O26.893 PREGNANCY RELATED LOW BACK PAIN IN THIRD TRIMESTER, ANTEPARTUM: Primary | ICD-10-CM

## 2024-09-13 PROCEDURE — 97530 THERAPEUTIC ACTIVITIES: CPT

## 2024-09-13 NOTE — THERAPY DISCHARGE NOTE
Outpatient Physical Therapy Ortho Progress Note/Discharge Summary  [facility]     Patient Name: Yuliya Gayle  : 1985  MRN: [id number]  Today's Date: 2024      Visit Date: 2024    Visit Dx:    ICD-10-CM ICD-9-CM   1. Pregnancy related low back pain in third trimester, antepartum  O26.893 646.83    M54.50 724.2   2. Pelvic pain affecting pregnancy in third trimester, antepartum  O26.893 646.83    R10.2 625.9   3. Impaired mobility  Z74.09 799.89       Patient Active Problem List   Diagnosis    Obesity (BMI 30-39.9)    History of     AMA (advanced maternal age) multigravida 35+    Pregnancy    History of         Past Medical History:   Diagnosis Date    Urinary tract infection     Urogenital trichomoniasis         Past Surgical History:   Procedure Laterality Date    BREAST BIOPSY       SECTION                          PT Assessment/Plan       Row Name 24 1100          PT Assessment    Assessment Comments Pt has been seen by PT for 3 total  sessions focused on pregnancy related pain. She is currently 37 weeks and reports improved tolerance for transitional positions regarding low back pain, however pubic symphysis pain remains present. She has met 1/1 STG and partially met 2/2 LTG at this time. Focused on education regarding HEP for hip stretching, perineal massage, and postpartum timelines for healing, pt receptive. Plan to DC from PT with plan to return at 6-8 weeks postpartum once cleared by MD.  -RS        PT Plan    PT Plan Comments DC  -RS               User Key  (r) = Recorded By, (t) = Taken By, (c) = Cosigned By      Initials Name Provider Type    Natasha Fontaine PT Physical Therapist                         OP Exercises       Row Name 24 1000             Subjective    Subjective Comments pt reports she is feeling better in her back but continues to have pain in the front of her pelvis  -RS         Subjective Pain    Subjective Pain Comment arrival  time 0929 appt time 0915  -RS         Total Minutes    45991 - PT Therapeutic Activity Minutes 23  -RS         Exercise 1    Exercise Name 1 education- perineal massage  -RS         Exercise 2    Exercise Name 2 education- hip flexor stretch, ball roll out, hip adductor stretch, piriformis stretch, hip circles, pelvic tils all with ball  -RS         Exercise 3    Exercise Name 3 discussion of use of peanut ball/birth ball during labor, encouraged movement with or without epidural  -RS                User Key  (r) = Recorded By, (t) = Taken By, (c) = Cosigned By      Initials Name Provider Type    Natasha Fontaine PT Physical Therapist                                    PT OP Goals       Row Name 09/13/24 1000          PT Short Term Goals    STG Date to Achieve 09/30/24  -RS     STG 1 The pt will be educated regarding appropriate body mechanics to minimize pain with transitional positions.  -RS     STG 1 Progress Met  -RS        Long Term Goals    LTG Date to Achieve 11/14/24  -RS     LTG 1 The pt will report understanding of labor education to facilitate improved labor prep.  -RS     LTG 1 Progress Partially Met  -RS     LTG 2 The pt will report ability to sleep 2 hours without waking to urinate or in pain to facilitate improved restorative sleep pattern.  -RS     LTG 2 Progress Partially Met  -RS               User Key  (r) = Recorded By, (t) = Taken By, (c) = Cosigned By      Initials Name Provider Type    Natasha Fontaine PT Physical Therapist                    Therapy Education  Education Details: Access Code [email or web address] see ex log  Given: HEP  Program: Reinforced, Progressed  How Provided: Verbal, Demonstration, Written  Provided to: Patient  Level of Understanding: Verbalized, Demonstrated              Time Calculation:   Start Time: 0929  Stop Time: 0953  Time Calculation (min): 24 min  Timed Charges  34647 - PT Therapeutic Activity Minutes: 23  Total Minutes  Timed Charges Total Minutes: 23   Total  Minutes: 23  Therapy Charges for Today       Code Description Service Date Service Provider Modifiers Qty    98467918446 HC PT THERAPEUTIC ACT EA 15 MIN 9/13/2024 Natasha Cannon, PT GP 2                  OP PT Discharge Summary  Date of Discharge: 09/13/24  Outcomes Achieved: Refer to plan of care for updates on goals achieved  Discharge Instructions/Additional Comments: see assess      Natasha Cannon, PT  9/13/2024

## 2024-09-17 ENCOUNTER — HOSPITAL ENCOUNTER (EMERGENCY)
Facility: HOSPITAL | Age: 39
Discharge: HOME | End: 2024-09-18
Attending: STUDENT IN AN ORGANIZED HEALTH CARE EDUCATION/TRAINING PROGRAM | Admitting: OBSTETRICS & GYNECOLOGY
Payer: COMMERCIAL

## 2024-09-18 VITALS
TEMPERATURE: 98.3 F | SYSTOLIC BLOOD PRESSURE: 112 MMHG | HEIGHT: 60 IN | HEART RATE: 112 BPM | BODY MASS INDEX: 36.12 KG/M2 | DIASTOLIC BLOOD PRESSURE: 73 MMHG | WEIGHT: 184 LBS | RESPIRATION RATE: 18 BRPM

## 2024-09-18 LAB
BACTERIA UR QL AUTO: ABNORMAL /HPF
BILIRUB UR QL STRIP: NEGATIVE
CLARITY UR: CLEAR
COLOR UR: YELLOW
GLUCOSE UR STRIP-MCNC: NEGATIVE MG/DL
HGB UR QL STRIP.AUTO: NEGATIVE
HYALINE CASTS UR QL AUTO: ABNORMAL /LPF
KETONES UR QL STRIP: NEGATIVE
LEUKOCYTE ESTERASE UR QL STRIP.AUTO: ABNORMAL
NITRITE UR QL STRIP: NEGATIVE
PH UR STRIP.AUTO: 7 [PH] (ref 5–8)
PROT UR QL STRIP: NEGATIVE
RBC # UR STRIP: ABNORMAL /HPF
REF LAB TEST METHOD: ABNORMAL
SP GR UR STRIP: 1.01 (ref 1–1.03)
SQUAMOUS #/AREA URNS HPF: ABNORMAL /HPF
UROBILINOGEN UR QL STRIP: ABNORMAL
WBC # UR STRIP: ABNORMAL /HPF

## 2024-09-18 PROCEDURE — 59025 FETAL NON-STRESS TEST: CPT

## 2024-09-18 PROCEDURE — 99284 EMERGENCY DEPT VISIT MOD MDM: CPT | Performed by: OBSTETRICS & GYNECOLOGY

## 2024-09-18 PROCEDURE — 81001 URINALYSIS AUTO W/SCOPE: CPT | Performed by: OBSTETRICS & GYNECOLOGY

## 2024-09-24 ENCOUNTER — ANESTHESIA EVENT (OUTPATIENT)
Dept: LABOR AND DELIVERY | Facility: HOSPITAL | Age: 39
End: 2024-09-24
Payer: COMMERCIAL

## 2024-09-24 ENCOUNTER — ANESTHESIA (OUTPATIENT)
Dept: LABOR AND DELIVERY | Facility: HOSPITAL | Age: 39
End: 2024-09-24
Payer: COMMERCIAL

## 2024-09-24 ENCOUNTER — HOSPITAL ENCOUNTER (INPATIENT)
Facility: HOSPITAL | Age: 39
LOS: 2 days | Discharge: HOME | End: 2024-09-26
Attending: STUDENT IN AN ORGANIZED HEALTH CARE EDUCATION/TRAINING PROGRAM | Admitting: OBSTETRICS & GYNECOLOGY
Payer: COMMERCIAL

## 2024-09-24 PROBLEM — Z34.90 PREGNANCY: Status: RESOLVED | Noted: 2024-03-01 | Resolved: 2024-09-24

## 2024-09-24 PROBLEM — O34.219 VBAC, DELIVERED: Status: ACTIVE | Noted: 2024-09-24

## 2024-09-24 PROBLEM — O09.529 AMA (ADVANCED MATERNAL AGE) MULTIGRAVIDA 35+: Status: RESOLVED | Noted: 2024-03-01 | Resolved: 2024-09-24

## 2024-09-24 LAB
ABO GROUP BLD: NORMAL
ALBUMIN SERPL-MCNC: 3.5 G/DL (ref 3.5–5.2)
ALBUMIN/GLOB SERPL: 1.1 G/DL
ALP SERPL-CCNC: 214 U/L (ref 39–117)
ALT SERPL W P-5'-P-CCNC: 13 U/L (ref 1–33)
ANION GAP SERPL CALCULATED.3IONS-SCNC: 14.5 MMOL/L (ref 5–15)
AST SERPL-CCNC: 20 U/L (ref 1–32)
BASOPHILS # BLD AUTO: 0.01 10*3/MM3 (ref 0–0.2)
BASOPHILS NFR BLD AUTO: 0.1 % (ref 0–1.5)
BILIRUB SERPL-MCNC: 0.3 MG/DL (ref 0–1.2)
BLD GP AB SCN SERPL QL: NEGATIVE
BUN SERPL-MCNC: 4 MG/DL (ref 6–20)
BUN/CREAT SERPL: 7.8 (ref 7–25)
CALCIUM SPEC-SCNC: 9.8 MG/DL (ref 8.6–10.5)
CHLORIDE SERPL-SCNC: 102 MMOL/L (ref 98–107)
CO2 SERPL-SCNC: 18.5 MMOL/L (ref 22–29)
CREAT SERPL-MCNC: 0.51 MG/DL (ref 0.57–1)
DEPRECATED RDW RBC AUTO: 41.6 FL (ref 37–54)
EGFRCR SERPLBLD CKD-EPI 2021: 122.7 ML/MIN/1.73
EOSINOPHIL # BLD AUTO: 0.02 10*3/MM3 (ref 0–0.4)
EOSINOPHIL NFR BLD AUTO: 0.2 % (ref 0.3–6.2)
ERYTHROCYTE [DISTWIDTH] IN BLOOD BY AUTOMATED COUNT: 14.9 % (ref 12.3–15.4)
GLOBULIN UR ELPH-MCNC: 3.2 GM/DL
GLUCOSE BLDC GLUCOMTR-MCNC: 112 MG/DL (ref 70–130)
GLUCOSE SERPL-MCNC: 100 MG/DL (ref 65–99)
HCT VFR BLD AUTO: 31.9 % (ref 34–46.6)
HGB BLD-MCNC: 10.3 G/DL (ref 12–15.9)
IMM GRANULOCYTES # BLD AUTO: 0.06 10*3/MM3 (ref 0–0.05)
IMM GRANULOCYTES NFR BLD AUTO: 0.6 % (ref 0–0.5)
LYMPHOCYTES # BLD AUTO: 2.42 10*3/MM3 (ref 0.7–3.1)
LYMPHOCYTES NFR BLD AUTO: 25.1 % (ref 19.6–45.3)
MCH RBC QN AUTO: 25.2 PG (ref 26.6–33)
MCHC RBC AUTO-ENTMCNC: 32.3 G/DL (ref 31.5–35.7)
MCV RBC AUTO: 78 FL (ref 79–97)
MONOCYTES # BLD AUTO: 0.9 10*3/MM3 (ref 0.1–0.9)
MONOCYTES NFR BLD AUTO: 9.3 % (ref 5–12)
NEUTROPHILS NFR BLD AUTO: 6.22 10*3/MM3 (ref 1.7–7)
NEUTROPHILS NFR BLD AUTO: 64.7 % (ref 42.7–76)
NRBC BLD AUTO-RTO: 0 /100 WBC (ref 0–0.2)
PLATELET # BLD AUTO: 288 10*3/MM3 (ref 140–450)
PMV BLD AUTO: 10.3 FL (ref 6–12)
POTASSIUM SERPL-SCNC: 3.4 MMOL/L (ref 3.5–5.2)
PROT SERPL-MCNC: 6.7 G/DL (ref 6–8.5)
RBC # BLD AUTO: 4.09 10*6/MM3 (ref 3.77–5.28)
RH BLD: POSITIVE
SODIUM SERPL-SCNC: 135 MMOL/L (ref 136–145)
T&S EXPIRATION DATE: NORMAL
TREPONEMA PALLIDUM IGG+IGM AB [PRESENCE] IN SERUM OR PLASMA BY IMMUNOASSAY: NORMAL
WBC NRBC COR # BLD AUTO: 9.63 10*3/MM3 (ref 3.4–10.8)

## 2024-09-24 PROCEDURE — 25010000002 OXYTOCIN PER 10 UNITS: Performed by: STUDENT IN AN ORGANIZED HEALTH CARE EDUCATION/TRAINING PROGRAM

## 2024-09-24 PROCEDURE — 82948 REAGENT STRIP/BLOOD GLUCOSE: CPT

## 2024-09-24 PROCEDURE — 99202 OFFICE O/P NEW SF 15 MIN: CPT | Performed by: OBSTETRICS & GYNECOLOGY

## 2024-09-24 PROCEDURE — 25810000003 LACTATED RINGERS SOLUTION: Performed by: STUDENT IN AN ORGANIZED HEALTH CARE EDUCATION/TRAINING PROGRAM

## 2024-09-24 PROCEDURE — C1755 CATHETER, INTRASPINAL: HCPCS | Performed by: ANESTHESIOLOGY

## 2024-09-24 PROCEDURE — 25010000002 PENICILLIN G POTASSIUM PER 600000 UNITS: Performed by: STUDENT IN AN ORGANIZED HEALTH CARE EDUCATION/TRAINING PROGRAM

## 2024-09-24 PROCEDURE — 86900 BLOOD TYPING SEROLOGIC ABO: CPT | Performed by: STUDENT IN AN ORGANIZED HEALTH CARE EDUCATION/TRAINING PROGRAM

## 2024-09-24 PROCEDURE — 86850 RBC ANTIBODY SCREEN: CPT | Performed by: STUDENT IN AN ORGANIZED HEALTH CARE EDUCATION/TRAINING PROGRAM

## 2024-09-24 PROCEDURE — 25810000003 SODIUM CHLORIDE 0.9 % SOLUTION: Performed by: STUDENT IN AN ORGANIZED HEALTH CARE EDUCATION/TRAINING PROGRAM

## 2024-09-24 PROCEDURE — 86780 TREPONEMA PALLIDUM: CPT | Performed by: STUDENT IN AN ORGANIZED HEALTH CARE EDUCATION/TRAINING PROGRAM

## 2024-09-24 PROCEDURE — 86901 BLOOD TYPING SEROLOGIC RH(D): CPT | Performed by: STUDENT IN AN ORGANIZED HEALTH CARE EDUCATION/TRAINING PROGRAM

## 2024-09-24 PROCEDURE — 85025 COMPLETE CBC W/AUTO DIFF WBC: CPT | Performed by: STUDENT IN AN ORGANIZED HEALTH CARE EDUCATION/TRAINING PROGRAM

## 2024-09-24 PROCEDURE — 10907ZC DRAINAGE OF AMNIOTIC FLUID, THERAPEUTIC FROM PRODUCTS OF CONCEPTION, VIA NATURAL OR ARTIFICIAL OPENING: ICD-10-PCS | Performed by: STUDENT IN AN ORGANIZED HEALTH CARE EDUCATION/TRAINING PROGRAM

## 2024-09-24 PROCEDURE — 80053 COMPREHEN METABOLIC PANEL: CPT | Performed by: STUDENT IN AN ORGANIZED HEALTH CARE EDUCATION/TRAINING PROGRAM

## 2024-09-24 RX ORDER — TERBUTALINE SULFATE 1 MG/ML
0.25 INJECTION, SOLUTION SUBCUTANEOUS AS NEEDED
Status: DISCONTINUED | OUTPATIENT
Start: 2024-09-24 | End: 2024-09-24 | Stop reason: HOSPADM

## 2024-09-24 RX ORDER — NALOXONE HCL 0.4 MG/ML
0.4 VIAL (ML) INJECTION
Status: DISCONTINUED | OUTPATIENT
Start: 2024-09-24 | End: 2024-09-24 | Stop reason: HOSPADM

## 2024-09-24 RX ORDER — FAMOTIDINE 20 MG/1
20 TABLET, FILM COATED ORAL 2 TIMES DAILY PRN
Status: DISCONTINUED | OUTPATIENT
Start: 2024-09-24 | End: 2024-09-24 | Stop reason: HOSPADM

## 2024-09-24 RX ORDER — ONDANSETRON 2 MG/ML
4 INJECTION INTRAMUSCULAR; INTRAVENOUS ONCE AS NEEDED
Status: DISCONTINUED | OUTPATIENT
Start: 2024-09-24 | End: 2024-09-24 | Stop reason: HOSPADM

## 2024-09-24 RX ORDER — ACETAMINOPHEN 325 MG/1
650 TABLET ORAL EVERY 6 HOURS PRN
Status: DISCONTINUED | OUTPATIENT
Start: 2024-09-24 | End: 2024-09-26 | Stop reason: HOSPADM

## 2024-09-24 RX ORDER — MORPHINE SULFATE 2 MG/ML
1 INJECTION, SOLUTION INTRAMUSCULAR; INTRAVENOUS EVERY 4 HOURS PRN
Status: DISCONTINUED | OUTPATIENT
Start: 2024-09-24 | End: 2024-09-24 | Stop reason: HOSPADM

## 2024-09-24 RX ORDER — SODIUM CHLORIDE 0.9 % (FLUSH) 0.9 %
10 SYRINGE (ML) INJECTION AS NEEDED
Status: DISCONTINUED | OUTPATIENT
Start: 2024-09-24 | End: 2024-09-24 | Stop reason: HOSPADM

## 2024-09-24 RX ORDER — IBUPROFEN 600 MG/1
600 TABLET, FILM COATED ORAL EVERY 6 HOURS PRN
Status: DISCONTINUED | OUTPATIENT
Start: 2024-09-24 | End: 2024-09-26 | Stop reason: HOSPADM

## 2024-09-24 RX ORDER — HYDROCORTISONE 25 MG/G
1 CREAM TOPICAL AS NEEDED
Status: DISCONTINUED | OUTPATIENT
Start: 2024-09-24 | End: 2024-09-26 | Stop reason: HOSPADM

## 2024-09-24 RX ORDER — DIPHENHYDRAMINE HCL 25 MG
25 CAPSULE ORAL NIGHTLY PRN
Status: DISCONTINUED | OUTPATIENT
Start: 2024-09-24 | End: 2024-09-26 | Stop reason: HOSPADM

## 2024-09-24 RX ORDER — MAGNESIUM CARB/ALUMINUM HYDROX 105-160MG
30 TABLET,CHEWABLE ORAL ONCE AS NEEDED
Status: DISCONTINUED | OUTPATIENT
Start: 2024-09-24 | End: 2024-09-24 | Stop reason: HOSPADM

## 2024-09-24 RX ORDER — DIPHENHYDRAMINE HYDROCHLORIDE 50 MG/ML
12.5 INJECTION INTRAMUSCULAR; INTRAVENOUS EVERY 8 HOURS PRN
Status: DISCONTINUED | OUTPATIENT
Start: 2024-09-24 | End: 2024-09-24 | Stop reason: HOSPADM

## 2024-09-24 RX ORDER — PENICILLIN G 3000000 [IU]/50ML
3 INJECTION, SOLUTION INTRAVENOUS EVERY 4 HOURS
Status: DISCONTINUED | OUTPATIENT
Start: 2024-09-24 | End: 2024-09-24 | Stop reason: HOSPADM

## 2024-09-24 RX ORDER — FENTANYL/ROPIVACAINE/NS/PF 2MCG/ML-.2
10 PLASTIC BAG, INJECTION (ML) INJECTION CONTINUOUS
Status: DISCONTINUED | OUTPATIENT
Start: 2024-09-24 | End: 2024-09-24

## 2024-09-24 RX ORDER — SODIUM CHLORIDE 9 MG/ML
40 INJECTION, SOLUTION INTRAVENOUS AS NEEDED
Status: DISCONTINUED | OUTPATIENT
Start: 2024-09-24 | End: 2024-09-24 | Stop reason: HOSPADM

## 2024-09-24 RX ORDER — OXYTOCIN/0.9 % SODIUM CHLORIDE 30/500 ML
250 PLASTIC BAG, INJECTION (ML) INTRAVENOUS CONTINUOUS
Status: DISPENSED | OUTPATIENT
Start: 2024-09-24 | End: 2024-09-24

## 2024-09-24 RX ORDER — ONDANSETRON 4 MG/1
4 TABLET, ORALLY DISINTEGRATING ORAL EVERY 6 HOURS PRN
Status: DISCONTINUED | OUTPATIENT
Start: 2024-09-24 | End: 2024-09-24 | Stop reason: HOSPADM

## 2024-09-24 RX ORDER — LIDOCAINE HYDROCHLORIDE AND EPINEPHRINE 15; 5 MG/ML; UG/ML
INJECTION, SOLUTION EPIDURAL AS NEEDED
Status: DISCONTINUED | OUTPATIENT
Start: 2024-09-24 | End: 2024-09-24 | Stop reason: SURG

## 2024-09-24 RX ORDER — SODIUM CHLORIDE 0.9 % (FLUSH) 0.9 %
10 SYRINGE (ML) INJECTION EVERY 12 HOURS SCHEDULED
Status: DISCONTINUED | OUTPATIENT
Start: 2024-09-24 | End: 2024-09-24 | Stop reason: HOSPADM

## 2024-09-24 RX ORDER — FAMOTIDINE 10 MG/ML
20 INJECTION, SOLUTION INTRAVENOUS 2 TIMES DAILY PRN
Status: DISCONTINUED | OUTPATIENT
Start: 2024-09-24 | End: 2024-09-24 | Stop reason: HOSPADM

## 2024-09-24 RX ORDER — TRANEXAMIC ACID 10 MG/ML
1000 INJECTION, SOLUTION INTRAVENOUS ONCE AS NEEDED
OUTPATIENT
Start: 2024-09-24

## 2024-09-24 RX ORDER — ACETAMINOPHEN 325 MG/1
650 TABLET ORAL EVERY 4 HOURS PRN
Status: DISCONTINUED | OUTPATIENT
Start: 2024-09-24 | End: 2024-09-24 | Stop reason: HOSPADM

## 2024-09-24 RX ORDER — MISOPROSTOL 200 UG/1
800 TABLET ORAL ONCE AS NEEDED
Status: DISCONTINUED | OUTPATIENT
Start: 2024-09-24 | End: 2024-09-24 | Stop reason: HOSPADM

## 2024-09-24 RX ORDER — BISACODYL 10 MG
10 SUPPOSITORY, RECTAL RECTAL DAILY PRN
Status: DISCONTINUED | OUTPATIENT
Start: 2024-09-25 | End: 2024-09-26 | Stop reason: HOSPADM

## 2024-09-24 RX ORDER — METHYLERGONOVINE MALEATE 0.2 MG/ML
200 INJECTION INTRAVENOUS ONCE AS NEEDED
Status: DISCONTINUED | OUTPATIENT
Start: 2024-09-24 | End: 2024-09-24 | Stop reason: HOSPADM

## 2024-09-24 RX ORDER — DOCUSATE SODIUM 100 MG/1
100 CAPSULE, LIQUID FILLED ORAL 2 TIMES DAILY
Status: DISCONTINUED | OUTPATIENT
Start: 2024-09-24 | End: 2024-09-26 | Stop reason: HOSPADM

## 2024-09-24 RX ORDER — SODIUM CHLORIDE 0.9 % (FLUSH) 0.9 %
1-10 SYRINGE (ML) INJECTION AS NEEDED
Status: DISCONTINUED | OUTPATIENT
Start: 2024-09-24 | End: 2024-09-26 | Stop reason: HOSPADM

## 2024-09-24 RX ORDER — TRAMADOL HYDROCHLORIDE 50 MG/1
50 TABLET ORAL EVERY 6 HOURS PRN
Status: DISCONTINUED | OUTPATIENT
Start: 2024-09-24 | End: 2024-09-26 | Stop reason: HOSPADM

## 2024-09-24 RX ORDER — SODIUM CHLORIDE, SODIUM LACTATE, POTASSIUM CHLORIDE, CALCIUM CHLORIDE 600; 310; 30; 20 MG/100ML; MG/100ML; MG/100ML; MG/100ML
125 INJECTION, SOLUTION INTRAVENOUS CONTINUOUS
Status: DISCONTINUED | OUTPATIENT
Start: 2024-09-24 | End: 2024-09-24

## 2024-09-24 RX ORDER — CARBOPROST TROMETHAMINE 250 UG/ML
250 INJECTION, SOLUTION INTRAMUSCULAR
Status: DISCONTINUED | OUTPATIENT
Start: 2024-09-24 | End: 2024-09-24 | Stop reason: HOSPADM

## 2024-09-24 RX ORDER — ONDANSETRON 4 MG/1
4 TABLET, ORALLY DISINTEGRATING ORAL EVERY 8 HOURS PRN
Status: DISCONTINUED | OUTPATIENT
Start: 2024-09-24 | End: 2024-09-26 | Stop reason: HOSPADM

## 2024-09-24 RX ORDER — LIDOCAINE HYDROCHLORIDE 15 MG/ML
INJECTION, SOLUTION EPIDURAL; INFILTRATION; INTRACAUDAL; PERINEURAL AS NEEDED
Status: DISCONTINUED | OUTPATIENT
Start: 2024-09-24 | End: 2024-09-24 | Stop reason: SURG

## 2024-09-24 RX ORDER — ONDANSETRON 2 MG/ML
4 INJECTION INTRAMUSCULAR; INTRAVENOUS EVERY 6 HOURS PRN
Status: DISCONTINUED | OUTPATIENT
Start: 2024-09-24 | End: 2024-09-26 | Stop reason: HOSPADM

## 2024-09-24 RX ORDER — EPHEDRINE SULFATE 50 MG/ML
5 INJECTION, SOLUTION INTRAVENOUS
Status: DISCONTINUED | OUTPATIENT
Start: 2024-09-24 | End: 2024-09-24 | Stop reason: HOSPADM

## 2024-09-24 RX ORDER — ONDANSETRON 2 MG/ML
4 INJECTION INTRAMUSCULAR; INTRAVENOUS EVERY 6 HOURS PRN
Status: DISCONTINUED | OUTPATIENT
Start: 2024-09-24 | End: 2024-09-24 | Stop reason: HOSPADM

## 2024-09-24 RX ORDER — OXYTOCIN/0.9 % SODIUM CHLORIDE 30/500 ML
999 PLASTIC BAG, INJECTION (ML) INTRAVENOUS ONCE
Status: COMPLETED | OUTPATIENT
Start: 2024-09-24 | End: 2024-09-24

## 2024-09-24 RX ORDER — PRENATAL VIT/IRON FUM/FOLIC AC 27MG-0.8MG
1 TABLET ORAL DAILY
Status: DISCONTINUED | OUTPATIENT
Start: 2024-09-25 | End: 2024-09-26 | Stop reason: HOSPADM

## 2024-09-24 RX ORDER — LIDOCAINE HYDROCHLORIDE 10 MG/ML
0.5 INJECTION, SOLUTION INFILTRATION; PERINEURAL ONCE AS NEEDED
Status: DISCONTINUED | OUTPATIENT
Start: 2024-09-24 | End: 2024-09-24 | Stop reason: HOSPADM

## 2024-09-24 RX ORDER — FAMOTIDINE 10 MG/ML
20 INJECTION, SOLUTION INTRAVENOUS ONCE AS NEEDED
Status: DISCONTINUED | OUTPATIENT
Start: 2024-09-24 | End: 2024-09-24 | Stop reason: HOSPADM

## 2024-09-24 RX ORDER — OXYTOCIN/0.9 % SODIUM CHLORIDE 30/500 ML
125 PLASTIC BAG, INJECTION (ML) INTRAVENOUS ONCE AS NEEDED
Status: COMPLETED | OUTPATIENT
Start: 2024-09-24 | End: 2024-09-24

## 2024-09-24 RX ORDER — OXYTOCIN/0.9 % SODIUM CHLORIDE 30/500 ML
125 PLASTIC BAG, INJECTION (ML) INTRAVENOUS ONCE AS NEEDED
OUTPATIENT
Start: 2024-09-24

## 2024-09-24 RX ADMIN — ACETAMINOPHEN 325MG 650 MG: 325 TABLET ORAL at 21:20

## 2024-09-24 RX ADMIN — DOCUSATE SODIUM 100 MG: 100 CAPSULE, LIQUID FILLED ORAL at 21:20

## 2024-09-24 RX ADMIN — Medication 125 ML/HR: at 19:40

## 2024-09-24 RX ADMIN — LIDOCAINE HYDROCHLORIDE 3 ML: 15 INJECTION, SOLUTION EPIDURAL; INFILTRATION; INTRACAUDAL; PERINEURAL at 17:17

## 2024-09-24 RX ADMIN — IBUPROFEN 600 MG: 600 TABLET, FILM COATED ORAL at 22:23

## 2024-09-24 RX ADMIN — OXYTOCIN 999 ML/HR: 10 INJECTION INTRAVENOUS at 17:40

## 2024-09-24 RX ADMIN — BENZOCAINE AND LEVOMENTHOL: 200; 5 SPRAY TOPICAL at 22:38

## 2024-09-24 RX ADMIN — SODIUM CHLORIDE, POTASSIUM CHLORIDE, SODIUM LACTATE AND CALCIUM CHLORIDE 1000 ML: 600; 310; 30; 20 INJECTION, SOLUTION INTRAVENOUS at 16:50

## 2024-09-24 RX ADMIN — LIDOCAINE HYDROCHLORIDE AND EPINEPHRINE 3 ML: 15; 5 INJECTION, SOLUTION EPIDURAL at 17:13

## 2024-09-24 RX ADMIN — LIDOCAINE HYDROCHLORIDE 3 ML: 15 INJECTION, SOLUTION EPIDURAL; INFILTRATION; INTRACAUDAL; PERINEURAL at 17:25

## 2024-09-24 RX ADMIN — SODIUM CHLORIDE 5 MILLION UNITS: 900 INJECTION INTRAVENOUS at 16:50

## 2024-09-24 RX ADMIN — LIDOCAINE HYDROCHLORIDE 3 ML: 15 INJECTION, SOLUTION EPIDURAL; INFILTRATION; INTRACAUDAL; PERINEURAL at 17:21

## 2024-09-24 RX ADMIN — Medication 10 ML/HR: at 17:26

## 2024-09-24 RX ADMIN — MAGNESIUM HYDROXIDE 10 ML: 2400 SUSPENSION ORAL at 22:38

## 2024-09-24 NOTE — L&D DELIVERY NOTE
[facility]   Vaginal Delivery Note    Patient Name: Yuliya Gayle  : 1985  MRN: [id number]    Date of Delivery: 2024     Diagnosis     Pre & Post-Delivery:  Intrauterine pregnancy at 38w5d  Labor status: Spontaneous Onset of Labor     Pregnancy  History of  section x 1   History of  x 1   AMA  Obesity affecting pregnancy  GBS positive            Problem List    Transfer to Postpartum     Review the Delivery Report for details.     Delivery     Delivery: , Spontaneous     YOB: 2023    Time of Birth:  Gestational Age 5:37 PM   38w5d     Anesthesia: Epidural     Delivering clinician: Chari Carroll    Forceps?   No   Vacuum? No    Shoulder dystocia present: No        Delivery narrative:        Procedure: Spontaneous vaginal delivery    Surgeon: Chari Carroll MD    Preop diagnosis:  39 y.o.  year old  in active labor at 38w5d with pregnancy complicated by history of C/s x 1, history of  x 1, AMA, obesity, GBS positive        Postop diagnosis: Same    Indications: Yuliya Gayle is a 39 y.o.  at 38w5d who presented to L&D with contractions. She was noted to be 6 cm dilated upon arrival and in active labor with painful contractions. She had IV placed, penicillin started for GBS prophylaxis, and anesthesia called for epidural. I arrived to the room shortly after attempting an epidural. The patient finally was able to sit still and have epidural placed. Shortly after that, while she was getting comfortable, there was a late fetal heart rate deceleration so I checked the patient and she was noted to be completely dilated with membranes intact. We immediately setup for delivery at this time. I then performed amniotomy with [place] at 1732. The patient was then encouraged to push as the fetal heart tones remained in the 60s-80s. She then pushed for 4 minutes prior to delivery. She was here less than an hour from arrival before delivery.    Findings: Female  infant, Apgar 8/9, Weight TBD; small first degree laceration noted and hemostatic so not repaired    Anesthesia: Epidural    EBL: 126 cc    Placenta: Delivered spontaneously intact and patient wanting to take home    Cord gases: n/a    Complications: None    Procedure:The cervix was noted to be completely dilated, completely effaced, and +1 station. Under continuous fetal heart rate monitoring, the patient was encouraged to push. With good maternal effort she delivered a viable female infant. The head presented in the OA presentation and restituted to DEVYN. There was no nuchal cord present. The right anterior fetal shoulder was then easily delivered with a gentle downward motion followed by the posterior fetal shoulder, followed by the remainder of the infant without difficulty. The infant was immediately vigorous. The cord was clamped roughly 60 seconds following delivery. The cord was cut and the infant was placed on mother's chest. Cord blood was then collected. The placenta then delivered spontaneously intact, and a three vessel cord was noted. Uterine message and pitocin 20 units IV was given until the fundus was firm. The cervix, vagina, perineum, and rectum were carefully inspected for lacerations and small first degree perineal noted to be hemostatic following pressure and not repaired. Counts for needles, sponges, laps and instruments were correct times two at the end of the delivery. There were no sponges left in the vagina. I was present and scrubbed for the entire delivery. There were no major complications. Mother and baby were bonding well at the end of the delivery.    Infant     Findings: female  infant     Infant observations: Weight: No birth weight on file.   Length:   in  Observations/Comments:  [facility] room 6      Apgars: 8  @ 1 minute /    9  @ 5 minutes     Placenta & Cord         Placenta delivered  Spontaneous  at   2024  5:40 PM     Cord: 3 vessels  present.   Nuchal Cord?  no   Cord  "blood obtained: Yes    Cord gases obtained:  No    Cord gas results: Venous:  No results found for: \"PHCVEN\", \"BECVEN\"    Arterial:  No results found for: \"PHCART\", \"BECART\"     Repair     Episiotomy: None     No    Lacerations: Yes  Laceration Information  Laceration Repaired?   Perineal: None;1st  No    Periurethral:       Labial:       Sulcus:       Vaginal:       Cervical:         Suture used for repair: n/a   Laceration Length for 3rd or 4th degree lacerations:n/a   Estimated Blood Loss:       Quantitative Blood Loss: Quantitative Blood Loss (mL): 126 mL (09/24/24 1815)        Complications     none    Disposition     Mother to Mother Baby/Postpartum  in stable condition currently.  Baby to remains with mom  in stable condition currently.    Chari Carroll MD  09/24/24  18:21 EDT        "

## 2024-09-24 NOTE — H&P
[facility]  Obstetric History and Physical    Chief Complaint   Patient presents with    Contractions     Patient has been jewel since 0900 this morning.  Good FM, no VB, some vaginal bleeding.     Patient is a 39 y.o. female  currently at 38w5d, who presents who presented with contractions that started this morning at 0900 and has worsened in pain over the last hour. She has also noted some light vaginal bleeding. Denies leakage of fluid. She reports good fetal movement. Upon arrival, she was evaluated by nursing and noted to be 6 cm in dilation with regular, painful contractions. I arrived to bedside shortly after she received her IV, started on Penicillin for GBS prophylaxis, and attempts were being made to get an epidural. She then was able to receive an epidural for pain control. Once the patient was starting to get comfortable, there was a late deceleration so she was checked and noted to be completely dilated with membranes still intact. We then setup for delivery. Please see delivery note for     Her prenatal care was with Dr. Carroll and was complicated by history of C/s x 1, history of  x 1, AMA, obesity, GBS positive.        External Prenatal Results       Pregnancy Outside Results - Transcribed From Office Records - See Scanned Records For Details       Test Value Date Time    ABO       Rh       Antibody Screen       Varicella IgG       Rubella       Hgb  10.3 g/dL 24 1652       10.2 g/dL 24 1211       11.6 g/dL 24 1006      ^ 11.5 g/dL 24 0612       12.6 g/dL 24 1217    Hct  31.9 % 24 1652       31.0 % 24 1211       35.0 % 24 1006      ^ 34.6 % 24 0612       38.6 % 24 1217    HgB A1c   6.1 % 24 1217    1h GTT  82 mg/dL 24 1214    3h GTT Fasting       3h GTT 1 hour       3h GTT 2 hour       3h GTT 3 hour        Gonorrhea (discrete)  Negative  24 1317       Negative  24 1014       Negative  24 1333        Negative  24 1256       Negative  23 1206    Chlamydia (discrete)  Negative  24 1317       Negative  24 1014       Negative  24 1333       Negative  24 1256       Negative  23 1206    RPR       Syphils cascade: TP-Ab (FTA)  Non-Reactive  24 1652       Non Reactive  24 1211    TP-Ab  Non-Reactive  24 1652       Non Reactive  24 1211    TP-Ab (EIA)       TPPA       HBsAg       Herpes Simplex Virus PCR       Herpes Simplex VIrus Culture       HIV       Hep C RNA Quant PCR       Hep C Antibody       AFP       NIPT       Cystic Fibroisis        Group B Strep  Positive  24 1635    GBS Susceptibility to Clindamycin       GBS Susceptibility to Erythromycin       Fetal Fibronectin       Genetic Testing, Maternal Blood                 Drug Screening       Test Value Date Time    Urine Drug Screen       Amphetamine Screen  Negative ng/mL 24 1014    Barbiturate Screen  Negative ng/mL 24 1014    Benzodiazepine Screen  Negative ng/mL 24 1014    Methadone Screen  Negative ng/mL 24 1014    Phencyclidine Screen  Negative ng/mL 24 1014    Opiates Screen       THC Screen       Cocaine Screen       Propoxyphene Screen  Negative ng/mL 24 1014    Buprenorphine Screen       Methamphetamine Screen       Oxycodone Screen       Tricyclic Antidepressants Screen                 Legend    ^: Historical                               OB History    Para Term  AB Living   5 2 2 0 2 2   SAB IAB Ectopic Molar Multiple Live Births   1 0 1 0 0 2      # Outcome Date GA Lbr John/2nd Weight Sex Type Anes PTL Lv   5 Current            4 SAB 2022     SAB      3 Term 10/19/19        JUANPABLO   2 Ectopic 2019           1 Term 14 37w6d    CS-Unspec   JUANPABLO      Complications: Fetal Intolerance, Failure to Progress in First Stage           Past Medical History:   Diagnosis Date    Urinary tract infection     Urogenital  "trichomoniasis           Past Surgical History:   Procedure Laterality Date    BREAST BIOPSY       SECTION            No current facility-administered medications on file prior to encounter.     Current Outpatient Medications on File Prior to Encounter   Medication Sig Dispense Refill    nystatin-triamcinolone (MYCOLOG) 852595-0.1 UNIT/GM-% ointment       Prenatal Vit-Fe Fumarate-FA (Prenatal/Folic Acid) tablet Take 1 tablet by mouth Daily. 30 each 10    terconazole (TERAZOL 7) 0.4 % vaginal cream Insert 1 applicator into the vagina Every Night. 45 g 1        No Known Allergies       Social History     Socioeconomic History    Marital status:      Spouse name: mary duong    Number of children: 2    Highest education level: Some college, no degree   Tobacco Use    Smoking status: Never    Smokeless tobacco: Never   Vaping Use    Vaping status: Never Used   Substance and Sexual Activity    Alcohol use: Never    Drug use: Never    Sexual activity: Yes     Partners: Male          Family History   Problem Relation Age of Onset    No Known Problems Mother     Hypertension Father     No Known Problems Sister     No Known Problems Brother     No Known Problems Daughter     No Known Problems Son     No Known Problems Maternal Aunt     No Known Problems Maternal Uncle     No Known Problems Paternal Aunt     No Known Problems Paternal Uncle     No Known Problems Maternal Grandmother     No Known Problems Maternal Grandfather     No Known Problems Paternal Grandmother     No Known Problems Paternal Grandfather         Review of Systems   Constitutional:  Negative for chills and fever.   Respiratory:  Negative for shortness of breath.    Cardiovascular:  Negative for chest pain.   Gastrointestinal:  Positive for abdominal pain.   Genitourinary:  Positive for vaginal bleeding. Negative for vaginal discharge.       Ht 142.2 cm (56\")   Wt 83.9 kg (185 lb)   LMP 2023   BMI 41.48 kg/m²     Physical " Exam  Vitals reviewed.   Constitutional:       General: She is not in acute distress.  HENT:      Head: Normocephalic and atraumatic.      Right Ear: External ear normal.      Left Ear: External ear normal.   Eyes:      Extraocular Movements: Extraocular movements intact.      Pupils: Pupils are equal, round, and reactive to light.   Pulmonary:      Effort: Pulmonary effort is normal. No respiratory distress.   Abdominal:      General: There is no distension.      Palpations: Abdomen is soft.      Tenderness: There is no abdominal tenderness. There is no guarding or rebound.      Comments: Gravid   Genitourinary:     Comments:  SVE: 10/100/+1 with membranes intact  Musculoskeletal:         General: No deformity. Normal range of motion.      Cervical back: Normal range of motion and neck supple.   Skin:     General: Skin is warm and dry.   Neurological:      General: No focal deficit present.      Mental Status: She is alert and oriented to person, place, and time.   Psychiatric:         Mood and Affect: Mood normal.         Behavior: Behavior normal.           FHT - 150s baseline, moderate variability, accels -, late deceleration   [place] - Q 1.5-2 mins     Lab Results   Component Value Date    WBC 9.63 09/24/2024    HGB 10.3 (L) 09/24/2024    HCT 31.9 (L) 09/24/2024    MCV 78.0 (L) 09/24/2024     09/24/2024       CMP          5/13/2024    10:06 9/24/2024    16:52   CMP   Glucose 104  100    BUN 6  4    Creatinine 0.55  0.51    EGFR 120.5  122.7    Sodium 136  135    Potassium 3.4  3.4    Chloride 106  102    Calcium 9.2  9.8    Total Protein 6.4  6.7    Albumin 3.5  3.5    Globulin 2.9  3.2    Total Bilirubin <0.2  0.3    Alkaline Phosphatase 89  214    AST (SGOT) 19  20    ALT (SGPT) 18  13    Albumin/Globulin Ratio 1.2  1.1    BUN/Creatinine Ratio 10.9  7.8    Anion Gap 9.8  14.5          Assessment:  1.  Intrauterine pregnancy at 38w5d weeks gestation with late decelerations present fetal status.    2.   labor  without ROM  3.  Obstetrical history significant for is remarkable for  history of C/s x 1, history of  x 1, AMA, obesity, GBS positive.  4.  GBS status: positive     Plan:  1. Admitted to L&D for active labor in setting of TOLAC. The patient had a  section and one previous successful . She arrived and quickly progressed from 6 cm to completely dilated with membranes intact. Will proceed with amniotomy and setup for delivery following epidural placement.   2. Plan of care has been reviewed with patient.  3.  Risks, benefits of treatment plan have been discussed.  4.  All questions have been answered.        Chari Carroll MD  2024  18:04 EDT

## 2024-09-24 NOTE — OBED NOTES
[place] [facility] [place] Name: Yuliya Gayle  YOB: 1985  MRN: [id number]  Admission Date: 2024  4:41 PM  Date of Service: 2024    Chief Complaint: Contractions (Patient has been jewel since 0900 this morning.  Good FM, no VB, some vaginal bleeding.)        Subjective     Yuliya Gayle is a 39 y.o. female  at 38w5d with Estimated Date of Delivery: 10/3/24 who presents with the chief complaint listed above.  Patient reports jewel since 9 AM.  Patient desires an epidural.  Patient desires  which she has done successfully in the past, her original  was in  for failure to progress.     She sees Chari Carroll MD for her prenatal care. Her pregnancy has been complicated by: AMA, obese, anemia, group B beta strep positive, history of UTI and trichomonas, prior  with  x 1    She describes fetal movement as normal.  She denies rupture of membranes.  She denies vaginal bleeding. She is feeling contractions.        Objective   Patient Active Problem List    Diagnosis     *Pregnancy [Z34.90]     Obesity (BMI 30-39.9) [E66.9]     History of  [Z98.891]     AMA (advanced maternal age) multigravida 35+ [O09.529]     History of  [Z98.891]         OB History    Para Term  AB Living   5 2 2 0 2 2   SAB IAB Ectopic Molar Multiple Live Births   1 0 1 0 0 2      # Outcome Date GA Lbr John/2nd Weight Sex Type Anes PTL Lv   5 Current            4 SAB 2022     SAB      3 Term 10/19/19        JUANPABLO   2 Ectopic 2019           1 Term 14 37w6d    CS-Unspec   JUANPABLO      Complications: Fetal Intolerance, Failure to Progress in First Stage        Past Medical History:   Diagnosis Date    Urinary tract infection     Urogenital trichomoniasis        Past Surgical History:   Procedure Laterality Date    BREAST BIOPSY       SECTION         No current facility-administered medications on file prior to encounter.     Current  Outpatient Medications on File Prior to Encounter   Medication Sig Dispense Refill    nystatin-triamcinolone (MYCOLOG) 514735-9.1 UNIT/GM-% ointment       Prenatal Vit-Fe Fumarate-FA (Prenatal/Folic Acid) tablet Take 1 tablet by mouth Daily. 30 each 10    terconazole (TERAZOL 7) 0.4 % vaginal cream Insert 1 applicator into the vagina Every Night. 45 g 1       No Known Allergies    Family History   Problem Relation Age of Onset    No Known Problems Mother     Hypertension Father     No Known Problems Sister     No Known Problems Brother     No Known Problems Daughter     No Known Problems Son     No Known Problems Maternal Aunt     No Known Problems Maternal Uncle     No Known Problems Paternal Aunt     No Known Problems Paternal Uncle     No Known Problems Maternal Grandmother     No Known Problems Maternal Grandfather     No Known Problems Paternal Grandmother     No Known Problems Paternal Grandfather        Social History     Socioeconomic History    Marital status:      Spouse name: mary duong    Number of children: 2    Highest education level: Some college, no degree   Tobacco Use    Smoking status: Never    Smokeless tobacco: Never   Vaping Use    Vaping status: Never Used   Substance and Sexual Activity    Alcohol use: Never    Drug use: Never    Sexual activity: Yes     Partners: Male           Review of Systems   Constitutional:  Negative for chills and fever.   HENT: Negative.     Eyes:  Negative for photophobia and visual disturbance.   Respiratory:  Negative for shortness of breath.    Cardiovascular:  Negative for chest pain.   Gastrointestinal:  Positive for abdominal pain. Negative for nausea.   Psychiatric/Behavioral:  The patient is not nervous/anxious.           PHYSICAL EXAM:      VITAL SIGNS:  There were no vitals filed for this visit.         FHT'S:   140s with moderate variability and accelerations                                     PHYSICAL EXAM:      General: well developed;  "well nourished  no acute distress  mentation appropriate   Heart: Not performed.   Lungs   breathing is unlabored   Abdomen: Gravid and non tender     Extremities: trace edema, DTRs 1 plus, no clonus       Cervix: Per RN  Cervical Dilation (cm): 6  Cervical Effacement: 100%  Fetal Station: -1  Cervical Consistency: soft  Cervical Position: mid-position     Contractions:   regular                    LABS AND TESTING ORDERED:  Uterine and fetal monitoring  Urinalysis  Admit labs    LAB RESULTS:    No results found for this or any previous visit (from the past 24 hour(s)).    No results found for: \"ABO\", \"RH\"    Lab Results   Component Value Date    STREPGPB Positive (A) 08/28/2024                 External Prenatal Results       Pregnancy Outside Results - Transcribed From Office Records - See Scanned Records For Details       Test Value Date Time    ABO       Rh       Antibody Screen       Varicella IgG       Rubella       Hgb  10.2 g/dL 07/24/24 1211       11.6 g/dL 05/13/24 1006      ^ 11.5 g/dL 03/04/24 0612       12.6 g/dL 01/11/24 1217    Hct  31.0 % 07/24/24 1211       35.0 % 05/13/24 1006      ^ 34.6 % 03/04/24 0612       38.6 % 01/11/24 1217    HgB A1c   6.1 % 01/11/24 1217    1h GTT  82 mg/dL 06/26/24 1214    3h GTT Fasting       3h GTT 1 hour       3h GTT 2 hour       3h GTT 3 hour        Gonorrhea (discrete)  Negative  08/07/24 1317       Negative  02/29/24 1014       Negative  02/08/24 1333       Negative  01/05/24 1256       Negative  12/28/23 1206    Chlamydia (discrete)  Negative  08/07/24 1317       Negative  02/29/24 1014       Negative  02/08/24 1333       Negative  01/05/24 1256       Negative  12/28/23 1206    RPR       Syphils cascade: TP-Ab (FTA)  Non Reactive  07/24/24 1211    TP-Ab  Non Reactive  07/24/24 1211    TP-Ab (EIA)       TPPA       HBsAg       Herpes Simplex Virus PCR       Herpes Simplex VIrus Culture       HIV       Hep C RNA Quant PCR       Hep C Antibody       AFP       NIPT       " Cystic Fibroisis        Group B Strep  Positive  24 1635    GBS Susceptibility to Clindamycin       GBS Susceptibility to Erythromycin       Fetal Fibronectin       Genetic Testing, Maternal Blood                 Drug Screening       Test Value Date Time    Urine Drug Screen       Amphetamine Screen  Negative ng/mL 24 1014    Barbiturate Screen  Negative ng/mL 24 1014    Benzodiazepine Screen  Negative ng/mL 24 1014    Methadone Screen  Negative ng/mL 24 1014    Phencyclidine Screen  Negative ng/mL 24 1014    Opiates Screen       THC Screen       Cocaine Screen       Propoxyphene Screen  Negative ng/mL 24 1014    Buprenorphine Screen       Methamphetamine Screen       Oxycodone Screen       Tricyclic Antidepressants Screen                 Legend    ^: Historical                              Impression:   @ 38w5d .  Final Diagnosis: Active labor, desires , group B beta strep positive    Plan:  1.  Dr. Carroll notified and patient admitted, fetal and uterine monitoring  continuously, expectant management, analgesia with  epidural, and antibiotic for GBS        Gianna Jennings MD  2024  16:51 EDT

## 2024-09-24 NOTE — PLAN OF CARE
Problem: Adult Inpatient Plan of Care  Goal: Plan of Care Review  Outcome: Progressing  Flowsheets (Taken 9/24/2024 1844)  Progress: improving  Plan of Care Reviewed With: patient  Outcome Evaluation: Pt status post vaginal delivery. Pt uterus firm, midline, bleeding scant. Pt bonding well with baby. Pt pain stable. Pt had no tears. Pt will be moved to postpartum post 2 hour recovery period.  Goal: Patient-Specific Goal (Individualized)  Outcome: Progressing  Goal: Absence of Hospital-Acquired Illness or Injury  Outcome: Progressing  Intervention: Identify and Manage Fall Risk  Recent Flowsheet Documentation  Taken 9/24/2024 1815 by Irving Pozo RN  Safety Promotion/Fall Prevention: safety round/check completed  Intervention: Prevent and Manage VTE (Venous Thromboembolism) Risk  Recent Flowsheet Documentation  Taken 9/24/2024 1815 by Irving Pozo RN  VTE Prevention/Management: patient refused intervention  Goal: Optimal Comfort and Wellbeing  Outcome: Progressing  Intervention: Provide Person-Centered Care  Recent Flowsheet Documentation  Taken 9/24/2024 1815 by Irving Pozo RN  Trust Relationship/Rapport:   thoughts/feelings acknowledged   care explained   choices provided  Goal: Readiness for Transition of Care  Outcome: Progressing   Goal Outcome Evaluation:  Plan of Care Reviewed With: patient        Progress: improving  Outcome Evaluation: Pt status post vaginal delivery. Pt uterus firm, midline, bleeding scant. Pt bonding well with baby. Pt pain stable. Pt had no tears. Pt will be moved to postpartum post 2 hour recovery period.

## 2024-09-25 LAB
BASOPHILS # BLD AUTO: 0.02 10*3/MM3 (ref 0–0.2)
BASOPHILS NFR BLD AUTO: 0.2 % (ref 0–1.5)
DEPRECATED RDW RBC AUTO: 43.7 FL (ref 37–54)
EOSINOPHIL # BLD AUTO: 0.02 10*3/MM3 (ref 0–0.4)
EOSINOPHIL NFR BLD AUTO: 0.2 % (ref 0.3–6.2)
ERYTHROCYTE [DISTWIDTH] IN BLOOD BY AUTOMATED COUNT: 15 % (ref 12.3–15.4)
HBV SURFACE AG SERPL QL IA: NORMAL
HCT VFR BLD AUTO: 30.1 % (ref 34–46.6)
HCV AB SER QL: NORMAL
HGB BLD-MCNC: 9.4 G/DL (ref 12–15.9)
HIV 1+2 AB+HIV1 P24 AG SERPL QL IA: NORMAL
IMM GRANULOCYTES # BLD AUTO: 0.05 10*3/MM3 (ref 0–0.05)
IMM GRANULOCYTES NFR BLD AUTO: 0.4 % (ref 0–0.5)
LYMPHOCYTES # BLD AUTO: 1.8 10*3/MM3 (ref 0.7–3.1)
LYMPHOCYTES NFR BLD AUTO: 15.8 % (ref 19.6–45.3)
MCH RBC QN AUTO: 24.8 PG (ref 26.6–33)
MCHC RBC AUTO-ENTMCNC: 31.2 G/DL (ref 31.5–35.7)
MCV RBC AUTO: 79.4 FL (ref 79–97)
MONOCYTES # BLD AUTO: 1.13 10*3/MM3 (ref 0.1–0.9)
MONOCYTES NFR BLD AUTO: 9.9 % (ref 5–12)
NEUTROPHILS NFR BLD AUTO: 73.5 % (ref 42.7–76)
NEUTROPHILS NFR BLD AUTO: 8.4 10*3/MM3 (ref 1.7–7)
NRBC BLD AUTO-RTO: 0 /100 WBC (ref 0–0.2)
PLATELET # BLD AUTO: 246 10*3/MM3 (ref 140–450)
PMV BLD AUTO: 10.3 FL (ref 6–12)
RBC # BLD AUTO: 3.79 10*6/MM3 (ref 3.77–5.28)
TREPONEMA PALLIDUM IGG+IGM AB [PRESENCE] IN SERUM OR PLASMA BY IMMUNOASSAY: NORMAL
WBC NRBC COR # BLD AUTO: 11.42 10*3/MM3 (ref 3.4–10.8)

## 2024-09-25 PROCEDURE — 86780 TREPONEMA PALLIDUM: CPT | Performed by: OBSTETRICS & GYNECOLOGY

## 2024-09-25 PROCEDURE — 86803 HEPATITIS C AB TEST: CPT | Performed by: OBSTETRICS & GYNECOLOGY

## 2024-09-25 PROCEDURE — 85025 COMPLETE CBC W/AUTO DIFF WBC: CPT | Performed by: STUDENT IN AN ORGANIZED HEALTH CARE EDUCATION/TRAINING PROGRAM

## 2024-09-25 PROCEDURE — 87340 HEPATITIS B SURFACE AG IA: CPT | Performed by: OBSTETRICS & GYNECOLOGY

## 2024-09-25 PROCEDURE — 0503F POSTPARTUM CARE VISIT: CPT

## 2024-09-25 PROCEDURE — 86762 RUBELLA ANTIBODY: CPT | Performed by: OBSTETRICS & GYNECOLOGY

## 2024-09-25 RX ORDER — SIMETHICONE 80 MG
80 TABLET,CHEWABLE ORAL 4 TIMES DAILY PRN
Status: DISCONTINUED | OUTPATIENT
Start: 2024-09-25 | End: 2024-09-26 | Stop reason: HOSPADM

## 2024-09-25 RX ADMIN — IBUPROFEN 600 MG: 600 TABLET, FILM COATED ORAL at 19:20

## 2024-09-25 RX ADMIN — IBUPROFEN 600 MG: 600 TABLET, FILM COATED ORAL at 10:39

## 2024-09-25 RX ADMIN — Medication 1 TABLET: at 10:39

## 2024-09-25 RX ADMIN — DOCUSATE SODIUM 100 MG: 100 CAPSULE, LIQUID FILLED ORAL at 10:39

## 2024-09-25 RX ADMIN — IBUPROFEN 600 MG: 600 TABLET, FILM COATED ORAL at 04:42

## 2024-09-25 NOTE — PLAN OF CARE
Goal Outcome Evaluation:  Plan of Care Reviewed With: patient, spouse        Progress: improving  Outcome Evaluation: vitals stable, assessment wdl, up ad manish, showered, iv out, pain controlled per po meds

## 2024-09-25 NOTE — LACTATION NOTE
P3,T 38 y/o  exclusively pumped for 1 year with 11 y/o- latching difficulty. Bf 5 year old for 2.5 yrs.Great milk supply with both. Her feeding plan is breast and supplementing with formula. 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   Mother just needed assistance waking baby. She independently latched and adjusted when baby nipple fed. Only verbal guidance needed and baby was able to pull in enough breast tissue for a deep latch. Good jaw movement and strong tugging noted. She feels comfortable about bf education. Recommended bf every 2-3 hours, reviewing bf education/ OPLC  in handbook prn. Will fax order for PBP and LC number is on board.

## 2024-09-25 NOTE — PROGRESS NOTES
VAGINAL DELIVERY POSTPARTUM DAY 1    2024  PATIENT: Yuliya Gayle        MR#:[id number]  LOCATION: [facility]  DATE OF ADMISSION: 2024  ADMISSION  DIAGNOSIS:   , delivered     CURRENT DIAGNOSIS: No notes have been filed under this hospital service.  Service: Hospitalist    SERVICE: Obstetrics      , delivered        PROCEDURES:  , Spontaneous     2024    5:37 PM      ASSESSMENT/PLAN  Status Post Vaginal Delivery Day 1:   Postpartum care immediately following vaginal delivery: Doing well. Continue routine supportive care. Discontinue IV, advance diet, may shower. Plan for discharge tomorrow as clinical picture allows.   Postpartum anemia:  hgb from 10.3 down to 9.4.  mL. She is currently asymptomatic.   Rubella unknown: results still pending at this time.     RH STATUS: O positive  SYPHILIS SCREEN DELIVERY ADMIT: treponemal antibody non-reactive upon admission  RUBELLA: unknown - results pending  VARICELLA: unknown immunity  INFANT GENDER: female      Subjective    Yuliya feels well.  Patient describes her lochia as less than menses.  Pain is well controlled.   Yuliya is passing flatus and ambulating well.      Objective   Temp: Temp:  [97.5 °F (36.4 °C)-99 °F (37.2 °C)] 98.4 °F (36.9 °C) Temp src: Oral   BP: BP: (104-131)/(55-84) 113/78        Pulse: Heart Rate:  [] 111  RR: Resp:  [12-18] 18    General:  Awake, alert, no acute distress   Cardiac: Regular rate and rhythm    Respiratory: Lungs clear bilaterally, normal respiratory effort    Abdomen: Soft, non-distended, fundus firm, below umbilicus, appropriately tender   Pelvis: deferred   Extremities: Calves NT bilaterally, DTR 2+, no clonus noted, trace edema     I/O last 3 completed shifts:  In: 1195.6 [I.V.:1095.6; IV Piggyback:100]  Out: 276 [Urine:150; Blood:126]  Lab Results   Component Value Date    WBC 11.42 (H) 2024    HGB 9.4 (L) 2024    HCT 30.1 (L) 2024    MCV 79.4 2024      09/25/2024    POCGLU 112 09/24/2024    CREATININE 0.51 (L) 09/24/2024    AST 20 09/24/2024    ALT 13 09/24/2024    HEPBSAG Non-Reactive 09/25/2024     Results from last 7 days   Lab Units 09/24/24  1652   ABO TYPING  O   RH TYPING  Positive   ANTIBODY SCREEN  Negative         Bettina Srivastava CNM  10:45 EDT  September 25, 2024

## 2024-09-25 NOTE — LACTATION NOTE
This note was copied from a baby's chart.  Delivered Spectra pump. Mom has been formula feeding and denies any questions.

## 2024-09-26 ENCOUNTER — PATIENT OUTREACH (OUTPATIENT)
Dept: LABOR AND DELIVERY | Facility: HOSPITAL | Age: 39
End: 2024-09-26
Payer: COMMERCIAL

## 2024-09-26 VITALS
HEIGHT: 56 IN | BODY MASS INDEX: 41.61 KG/M2 | RESPIRATION RATE: 16 BRPM | WEIGHT: 185 LBS | TEMPERATURE: 97.9 F | HEART RATE: 75 BPM | SYSTOLIC BLOOD PRESSURE: 118 MMHG | OXYGEN SATURATION: 100 % | DIASTOLIC BLOOD PRESSURE: 78 MMHG

## 2024-09-26 LAB — RUBV IGG SERPL IA-ACNC: 14.1 INDEX

## 2024-09-26 PROCEDURE — 0503F POSTPARTUM CARE VISIT: CPT

## 2024-09-26 RX ORDER — NYSTATIN AND TRIAMCINOLONE ACETONIDE 100000; 1 [USP'U]/G; MG/G
OINTMENT TOPICAL 2 TIMES DAILY
Qty: 30 G | Refills: 1 | Status: SHIPPED | OUTPATIENT
Start: 2024-09-26

## 2024-09-26 RX ORDER — FERROUS SULFATE 325(65) MG
325 TABLET ORAL
Qty: 30 TABLET | Refills: 1 | Status: SHIPPED | OUTPATIENT
Start: 2024-09-26

## 2024-09-26 RX ORDER — PSEUDOEPHEDRINE HCL 30 MG
100 TABLET ORAL 2 TIMES DAILY
Qty: 60 CAPSULE | Refills: 2 | Status: SHIPPED | OUTPATIENT
Start: 2024-09-26

## 2024-09-26 RX ORDER — IBUPROFEN 600 MG/1
600 TABLET, FILM COATED ORAL EVERY 6 HOURS PRN
Qty: 60 TABLET | Refills: 0 | Status: SHIPPED | OUTPATIENT
Start: 2024-09-26

## 2024-09-26 RX ADMIN — TRAMADOL HYDROCHLORIDE 50 MG: 50 TABLET ORAL at 00:43

## 2024-09-26 RX ADMIN — IBUPROFEN 600 MG: 600 TABLET, FILM COATED ORAL at 08:14

## 2024-09-26 RX ADMIN — ACETAMINOPHEN 325MG 650 MG: 325 TABLET ORAL at 02:28

## 2024-09-26 RX ADMIN — DOCUSATE SODIUM 100 MG: 100 CAPSULE, LIQUID FILLED ORAL at 08:14

## 2024-09-26 RX ADMIN — DOCUSATE SODIUM 100 MG: 100 CAPSULE, LIQUID FILLED ORAL at 00:43

## 2024-09-26 RX ADMIN — Medication 1 TABLET: at 08:14

## 2024-09-26 RX ADMIN — IBUPROFEN 600 MG: 600 TABLET, FILM COATED ORAL at 01:02

## 2024-09-26 NOTE — DISCHARGE SUMMARY
VAGINAL DELIVERY DISCHARGE SUMMARY      PATIENT: Yuliya Gayle        MR#:[id number]  LOCATION: [facility]  ADMISSION  DIAGNOSIS:   , delivered    Postpartum anemia     DISCHARGE DIAGNOSIS: No diagnosis found.  SERVICE: Obstetrics    DATE OF ADMISSION: 2024  DATE OF DISCHARGE: 24       , delivered    Postpartum anemia        PROCEDURES:  , Spontaneous     2024    5:37 PM      RH STATUS: O positive  SYPHILIS SCREEN DELIVERY ADMIT: treponemal antibody non-reactive upon admission  RUBELLA: immune  VARICELLA: unknown immunity  INFANT GENDER: female      HOSPITAL COURSE: Yuliya underwent vaginal delivery of a female infant and remained in the hospital for 2 days. During that time, Yuliya remained afebrile and hemodynamically stable. On the day of discharge, Yuliya was eating, ambulating, passing flatus, and voiding without difficulty. Her hemoglobin was 9.4 postpartum and she was started on oral ferrous sulfate for her anemia.      LABS:   Lab Results   Component Value Date    HGB 9.4 (L) 2024    HCT 30.1 (L) 2024    MCV 79.4 2024     2024    POCGLU 112 2024    CREATININE 0.51 (L) 2024    AST 20 2024    ALT 13 2024     Results from last 7 days   Lab Units 24  1652   ABO TYPING  O   RH TYPING  Positive   ANTIBODY SCREEN  Negative       DISCHARGE MEDICATIONS     Discharge Medications        New Medications        Instructions Start Date   docusate sodium 100 MG capsule   100 mg, Oral, 2 Times Daily      ferrous sulfate 325 (65 FE) MG tablet   325 mg, Oral, Daily With Breakfast      ibuprofen 600 MG tablet  Commonly known as: ADVIL,MOTRIN   600 mg, Oral, Every 6 Hours PRN             Changes to Medications        Instructions Start Date   nystatin-triamcinolone 158399-2.1 UNIT/GM-% ointment  Commonly known as: MYCOLOG  What changed: See the new instructions.   Topical, 2 Times Daily             Continue These Medications         Instructions Start Date   Prenatal/Folic Acid tablet   1 tablet, Oral, Daily             Stop These Medications      terconazole 0.4 % vaginal cream  Commonly known as: TERAZOL 7              DISCHARGE DISPOSITION: Home    DISCHARGE CONDITION: Stable    DISCHARGE DIET: Regular    ACTIVITY AT DISCHARGE: Pelvic rest    INFANT FEEDING PLANS: Breast    EDUCATION: Warning signs and symptoms given, no tub baths, nothing in the vagina for 6 weeks.     FOLLOW-UP APPOINTMENTS: [place] up [place] [facility] [place] [facility]  in 4 to 6 weeks for routine postpartum visit.     Bettina Srivastava CNM  09/26/24  09:47 EDT

## 2024-09-26 NOTE — LACTATION NOTE
This note was copied from a baby's chart.  Plans for discharge home today.  Reports baby is latching well and is supplementing with formula if baby is fussy at breast.  Educated on pumping if baby does not latch to help establish adequate milk supply..  Educated on when to expect full milk supply, symptoms and treatment for engorgement, and info given for OPLC.  Encouraged to call for any questions or concerns.

## 2024-09-26 NOTE — PROGRESS NOTES
VAGINAL DELIVERY POSTPARTUM DAY 2    2024  PATIENT: Yuliya Gayle        MR#:[id number]  LOCATION: [facility]  DATE OF ADMISSION: 2024  ADMISSION  DIAGNOSIS:   , delivered    Postpartum anemia     CURRENT DIAGNOSIS: No notes have been filed under this hospital service.  Service: Hospitalist    SERVICE: Obstetrics      , delivered    Postpartum anemia        PROCEDURES:  , Spontaneous     2024    5:37 PM      ASSESSMENT/PLAN  Status Post Vaginal Delivery Day 2:   Postpartum care immediately following vaginal delivery:  Doing well. Meeting all milestones for discharge.   Postpartum anemia:  hgb from 10.3 down to 9.4.  mL. She is currently asymptomatic.   BMI: 41.48. Encouraged ambulation at home.   Discharge to home: Discharge instructions given, precautions reviewed. Follow up with [facility]  in 4 to 6 weeks for routine postpartum visit. Prescription for ibuprofen 600mg PO every 6 hours PRN for pain, docusate 100mg PO BID, and ferrous sulfate 325mg daily. Advised no tampons, menstrual cups, intercourse, or tub baths for 6 weeks.     RH STATUS: O positive  SYPHILIS SCREEN DELIVERY ADMIT: treponemal antibody non-reactive upon admission  RUBELLA: immune  VARICELLA: unknown immunity  INFANT GENDER: female      Subjective    Yuliya feels well.  Patient describes her lochia as less than menses.  Pain is well controlled.  Yuliya is passing flatus and ambulating well.      Objective   Temp: Temp:  [97.4 °F (36.3 °C)-98.2 °F (36.8 °C)] 97.4 °F (36.3 °C) Temp src: Oral   BP: BP: (106-113)/(57-69) 113/57        Pulse: Heart Rate:  [82-84] 82  RR: Resp:  [16-17] 16    General:  Awake, alert, no acute distress   Cardiac: Regular rate and rhythm    Respiratory: Lungs clear bilaterally, normal respiratory effort    Abdomen: Soft, non-distended, fundus firm, below umbilicus, appropriately tender   Pelvis: deferred   Extremities: Calves NT bilaterally, DTR 2+, no clonus noted, trace edema     No  intake/output data recorded.  Lab Results   Component Value Date    WBC 11.42 (H) 09/25/2024    HGB 9.4 (L) 09/25/2024    HCT 30.1 (L) 09/25/2024    MCV 79.4 09/25/2024     09/25/2024    POCGLU 112 09/24/2024    CREATININE 0.51 (L) 09/24/2024    AST 20 09/24/2024    ALT 13 09/24/2024    HEPBSAG Non-Reactive 09/25/2024     Results from last 7 days   Lab Units 09/24/24  1652   ABO TYPING  O   RH TYPING  Positive   ANTIBODY SCREEN  Negative       Bettina Srivastava CNM  09:44 EDT  September 26, 2024

## 2024-10-07 ENCOUNTER — PATIENT OUTREACH (OUTPATIENT)
Dept: LABOR AND DELIVERY | Facility: HOSPITAL | Age: 39
End: 2024-10-07
Payer: COMMERCIAL

## 2024-10-07 NOTE — OUTREACH NOTE
Motherhood Connection  Unable to Reach       Questions/Answers      Flowsheet Row Responses   Pending Outreach Postpartum Check-in   Call Attempt First   Outcome Left message   Next Call Attempt Date 10/08/24            Bea Rodrigues RN  Maternity Nurse Navigator    10/7/2024, 14:35 EDT

## 2024-10-08 ENCOUNTER — PATIENT OUTREACH (OUTPATIENT)
Dept: LABOR AND DELIVERY | Facility: HOSPITAL | Age: 39
End: 2024-10-08
Payer: COMMERCIAL

## 2024-10-08 NOTE — OUTREACH NOTE
Motherhood Connection  Postpartum Check-In    Questions/Answers      Flowsheet Row Responses   Visit Setting Telephone   Best Method for Contacting Cell   OB Discharge Note Reviewed  Reviewed   OB Discharge Medications Reviewed  Reviewed   [place] discharged home with mother? Yes   Current Pain Levels 0-10 3   At Rest Pain Levels 0-10 2   Pain level with activity 0-10 3   Acceptable Pain Level 0-10 2   Pain Location Breast   Pain Description Sore   Lochia (per patient report) Brown-nini Red   Amount Scant, None   Number of pads per day 1   Lochia Odor None   Is patient breastfeeding? Yes, pumping   pumping - Left Breast Tender, Full   Pumping - Right Breast Tender, Full   Pumping - Left Nipple Intact   Pumping - Right Nipple Intact   Frequency every few hours   Pumping Quantity 3-6oz   Storage of Milk giving in bottles   Doctor Appointments: Education Provided   Breastfeeding Education Education Provided   Postpartum Care Education Education Provided   S & S to report Education Provided   Followup Appointments Made No  [she will call]   Well Child Visit Appointments Made Yes   Well Child Checkup Provider Name [facility]   Well Child Check Up Date: 24   Did you complete the visit? Yes   Were there any specific concerns? No   Umbilical Cord No reported signs or symptoms   Feeding Readiness Cues: Eager, Crying   Infant Feeding Method Expressed Breast Milk, Formula   Is a lactation referral indicated? No   Formula Type --  [similac sensitive]   Formula PO (mL) as needed to supplement   Formula/Expressed Milk frequency of feedings: every 3 hours   Expressed milk PO (mL) 3-4 oz   Expressed milk- frequency of feedings every 3 hours   Number of wet diapers x 24 hours 10   Last BM x 24 hours 6   What safe sleep surface is available? Bassinet   Are there stuffed animals, toys, pillows, quilts, blankets, wedges, positioners, bumpers or other loose bedding in the infant's sleeping environment? No   Where does  the baby usually sleep? [place]   Does the baby ever share a sleep surface with a sibling, adult or pet? No   Does the baby ever share a sleep surface in a bed, couch, recliner or other? No   What position do you place your baby to sleep for naps? Back   What position do you place your baby to sleep at night Back   Are you and/or other caregivers smoking inside or outside the baby's home? No   Is the infant dressed appropiately for the temperature of the home? Yes   Do you use a clean, dry pacifier that is not attached to a string or stuffed animal? Yes            Review of Systems    Most Recent [place]  Depression Scale Score (EPDS)    Performed by a clinician: 2 (10/8/2024  3:45 PM)      5 Ps Screen  Done     Pt is doing well. She is mostly pumping and just supplementing a bit when needed. She reports minimal pain and scant bleeding. Infant seen at the peds office with no reported concerns. She is asking about lactation advice to see if infant might latch better, sent info for lactation clinic. She will also call for her PP appt. EPDS much lower this week and she reports she is feeling good. Will send to RN call center.     Bea Rodrigues RN  Maternity Nurse Navigator    10/8/2024, 16:10 EDT

## 2024-10-15 ENCOUNTER — PATIENT OUTREACH (OUTPATIENT)
Dept: CALL CENTER | Facility: HOSPITAL | Age: 39
End: 2024-10-15
Payer: COMMERCIAL

## 2024-10-15 NOTE — OUTREACH NOTE
Motherhood Connection Survey      Flowsheet Row Responses   [facility]   [facility] successful? Yes   Call start time [phone]   Call end time [phone]   Baby sex Girl   Baby sex Girl   Delivery type Vaginal   Emotional state Acceptance   Family support Yes   Do you have all necessary resources to care for you and your baby?  Yes   Have members of your household adjusted to your baby? Yes   Did you have any problems with pre-eclampsia during this pregnancy? No   Did you have blood glucose issues during this pregnancy No   Lochia amount None   Did you have an episiotomy/tear/abdominal incision? No   Feeding Method Combination   Frequency 2 hours   Duration 2 ounces   Pumping Yes   Breast Condition No   Nipple Condition No   Signs baby is ready to eat Rooting, Crying   Number of wet diapers x 24 hours 8-10   Last BM x 24 hours 4-5   Umbilical Cord No reported signs or symptoms   Where does the baby usually sleep? Bassinet   Are there stuffed animals, toys, pillows, quilts, blankets, wedges, positioners, bumpers or other loose bedding in the infant's sleeping environment? No   Does the baby ever share a sleep surface in a bed, couch, recliner or other? No   What position do you lay your baby down to sleep? Back   Mom appointment comments: 11/4/24   Baby appointment comments: 10/25/24   Additional comments Using balm on nipples uses a warm towel for engorgements   Call completed? Yes              Kait HENDERSON - Registered Nurse

## 2024-11-04 ENCOUNTER — POSTPARTUM VISIT (OUTPATIENT)
Dept: OBSTETRICS AND GYNECOLOGY | Facility: CLINIC | Age: 39
End: 2024-11-04
Payer: COMMERCIAL

## 2024-11-04 VITALS
WEIGHT: 160.4 LBS | DIASTOLIC BLOOD PRESSURE: 83 MMHG | HEIGHT: 56 IN | BODY MASS INDEX: 36.08 KG/M2 | HEART RATE: 67 BPM | SYSTOLIC BLOOD PRESSURE: 120 MMHG

## 2024-11-04 DIAGNOSIS — O34.219 VBAC (VAGINAL BIRTH AFTER CESAREAN): ICD-10-CM

## 2024-11-04 DIAGNOSIS — N89.8 VAGINAL ITCHING: ICD-10-CM

## 2024-11-04 RX ORDER — FLUCONAZOLE 150 MG/1
150 TABLET ORAL
Qty: 2 TABLET | Refills: 0 | Status: SHIPPED | OUTPATIENT
Start: 2024-11-04

## 2024-11-04 NOTE — PROGRESS NOTES
"Chief Complaint   Patient presents with    Postpartum Care     Pt is here today for 6 week postpartum follow up, vaginal delivery 2024        SUBJECTIVE:   Yuliya Gayle is a 39 y.o.  who presents s/p Spontaneous Vaginal Delivery After  Section on 24 following labor at 38w5d. She reports doing well. She is having some vaginal itching off and on. Bowel and bladder function have returned to normal. She reports her vaginal bleeding has stopped and she has not yet had a period. She is breast and bottle feeding. Denies mood changes and scored a 0 on the postpartum depression scale.     Past Medical History:   Diagnosis Date    Urinary tract infection     Urogenital trichomoniasis      Past Surgical History:   Procedure Laterality Date    BREAST BIOPSY       SECTION       Social History     Tobacco Use    Smoking status: Never    Smokeless tobacco: Never   Vaping Use    Vaping status: Never Used   Substance Use Topics    Alcohol use: Never    Drug use: Never     Family History   Problem Relation Age of Onset    No Known Problems Mother     Hypertension Father     No Known Problems Sister     No Known Problems Brother     No Known Problems Daughter     No Known Problems Son     No Known Problems Maternal Aunt     No Known Problems Maternal Uncle     No Known Problems Paternal Aunt     No Known Problems Paternal Uncle     No Known Problems Maternal Grandmother     No Known Problems Maternal Grandfather     No Known Problems Paternal Grandmother     No Known Problems Paternal Grandfather        Patient Active Problem List   Diagnosis    Obesity (BMI 30-39.9)    History of     History of     , delivered    Postpartum anemia        OBJECTIVE:   /83   Pulse 67   Ht 142.2 cm (55.98\")   Wt 72.8 kg (160 lb 6.4 oz)   LMP 2023   Breastfeeding Yes   BMI 35.98 kg/m²    Physical Examination:   General appearance - alert, well appearing, and in no distress  Breasts - " Examined in supine position  Symmetric without masses or skin dimpling  Nipples normal without inversion, lesions or discharge  There are no palpable axillary nodes  Chest - no tachypnea, retractions or cyanosis  Heart - normal rate and regular rhythm  Abdomen - soft, nontender, nondistended, no masses or organomegaly  Pelvic - normal external female genitalia, normal vulva, normal appearing vaginal mucosa with thick white discharge coating the walls, normal appearing cervix, uterus non-tender and normal sized, no adnexal masses or tenderness   Neurological - screening mental status exam normal  Musculoskeletal - no joint tenderness, deformity or swelling  Psychiatric - normal mood and affect    Lab Results   Component Value Date    WBC 11.42 (H) 2024    HGB 9.4 (L) 2024    HCT 30.1 (L) 2024    MCV 79.4 2024     2024        ASSESSMENT:   S/p    2. Vaginal itching     PLAN:   Doing well postpartum except for vaginal itching. Collected NuSwab BV/Candida and prescribed Diflucan 150 mg PO Q 3 days x 2 doses.   Baby is doing well.   Contraception - She is considering the birth control patch but declines contraception at this time.   At this time, she may resume normal activities including exercise, lifting, and sexual intercourse.   Reviewed last pap smear - 24- NILM, negative HPV. Up to date.  Return in about 1 year (around 2025) for Annual physical.    Chari Carroll MD
